# Patient Record
Sex: MALE | Race: WHITE | Employment: OTHER | URBAN - METROPOLITAN AREA
[De-identification: names, ages, dates, MRNs, and addresses within clinical notes are randomized per-mention and may not be internally consistent; named-entity substitution may affect disease eponyms.]

---

## 2020-06-09 VITALS
HEART RATE: 75 BPM | OXYGEN SATURATION: 93 % | DIASTOLIC BLOOD PRESSURE: 70 MMHG | TEMPERATURE: 97.4 F | RESPIRATION RATE: 18 BRPM | HEIGHT: 73 IN | SYSTOLIC BLOOD PRESSURE: 109 MMHG | WEIGHT: 274.47 LBS | BODY MASS INDEX: 36.38 KG/M2

## 2020-06-09 PROBLEM — E07.9 THYROID MASS: Status: ACTIVE | Noted: 2020-06-05

## 2020-06-09 PROBLEM — L03.115 CELLULITIS OF RIGHT LOWER EXTREMITY: Status: ACTIVE | Noted: 2020-06-05

## 2020-06-09 PROBLEM — S82.201A TIBIA/FIBULA FRACTURE, RIGHT, CLOSED, INITIAL ENCOUNTER: Status: ACTIVE | Noted: 2020-06-05

## 2020-06-09 PROBLEM — R09.02 HYPOXEMIA: Status: ACTIVE | Noted: 2020-06-05

## 2020-06-09 PROBLEM — S82.401A TIBIA/FIBULA FRACTURE, RIGHT, CLOSED, INITIAL ENCOUNTER: Status: ACTIVE | Noted: 2020-06-05

## 2020-06-09 RX ORDER — MULTIPLE VITAMINS W/ MINERALS TAB 9MG-400MCG
1 TAB ORAL DAILY
COMMUNITY

## 2020-06-09 RX ORDER — ASPIRIN 81 MG/1
81 TABLET ORAL DAILY
COMMUNITY

## 2020-06-09 RX ORDER — VENLAFAXINE HYDROCHLORIDE 150 MG/1
150 CAPSULE, EXTENDED RELEASE ORAL
COMMUNITY

## 2020-06-09 RX ORDER — BACLOFEN 20 MG/1
20 TABLET ORAL 4 TIMES DAILY
COMMUNITY
End: 2020-06-10

## 2020-06-09 RX ORDER — HYDROCHLOROTHIAZIDE 25 MG/1
25 TABLET ORAL DAILY
COMMUNITY
End: 2020-06-29

## 2020-06-09 RX ORDER — SENNOSIDES A AND B 8.6 MG/1
3 TABLET, FILM COATED ORAL 2 TIMES DAILY
COMMUNITY
End: 2020-07-13

## 2020-06-09 RX ORDER — CEPHALEXIN 500 MG/1
500 CAPSULE ORAL 3 TIMES DAILY
COMMUNITY
End: 2020-06-22

## 2020-06-09 RX ORDER — ACETAMINOPHEN 325 MG/1
325 TABLET ORAL 4 TIMES DAILY
COMMUNITY
End: 2020-06-10 | Stop reason: DRUGHIGH

## 2020-06-09 RX ORDER — GABAPENTIN 300 MG/1
300 CAPSULE ORAL 3 TIMES DAILY
COMMUNITY

## 2020-06-09 RX ORDER — GUAR GUM
PACKET (EA) ORAL DAILY
COMMUNITY
End: 2020-06-10

## 2020-06-09 RX ORDER — AMLODIPINE BESYLATE 10 MG/1
10 TABLET ORAL DAILY
COMMUNITY
End: 2020-07-10

## 2020-06-09 RX ORDER — CLOTRIMAZOLE 1 %
CREAM (GRAM) TOPICAL 2 TIMES DAILY PRN
COMMUNITY

## 2020-06-09 ASSESSMENT — MIFFLIN-ST. JEOR: SCORE: 2088.75

## 2020-06-09 NOTE — PROGRESS NOTES
" [facility]  Madhav Kirk is being evaluated via a billable video visit due to the restrictions of the Covid-19 pandemic and facility request that providers do not come into the building at this time.   The patient has been notified of following:  \"This video visit will be conducted via a call between you and your provider. We have found that certain health care needs can be provided without the need for an in-person physical exam.  This service lets us provide the care you need with a video conversation. If during the course of the call the provider feels a video visit is not appropriate, you will not be charged for this service.\"   The provider has received verbal consent for a Video Visit from the patient and or first contact? Yes  Patient/facility staff would like the video invitation sent by: N/A   Video Start Time: 9:30  Which Facility the Patient is at during the time of visit: [facility]  Received verbal consent to use Care Everywhere in order to access labs, documents, histories, and all other needed information to provide care at current facility.  PRIMARY CARE PROVIDER AND CLINIC:   Nikolai Good MD  [phone]  Chief Complaint   Patient presents with     Hospital F/U     [place] [facility] Medical Record Number:  [id number]  Madhav Kirk  is a 64 year old  (1955), admitted to the above facility from  [facility]. Hospital stay 6/5/2020 through 6/9/2020..  Admitted to this facility for  rehab, medical management and nursing care.  HPI:    HPI information obtained from: facility chart records, facility staff, patient report and Care Everywhere Epic chart review.   Brief Summary of Hospital Course:   Madhav Kirk is a 64 y.o. male with PMH Of right sided hemiparesis secondary to intraparenchymal brain bleed, HTN, presented to the ER after fall at home and knee pain-  trying to transfer to his bedside commode when the right leg gave out and he fell. Work up + for "  right proximal tib/fib fractures, RLE cellulitis and hypoxemia. Treated non surgical, NWB - placed in leg immobilizer with PT/OT referral  At baseline his ability to ambulate is limited to 8-10 feet with a walker. he was   CT chest PE study normal but did show incidental mediastinal mass and suggest pulmonary hypertension. Mass possibly thyroidal in nature -check u/s and Pul Eval as out patient -Consideration of bx vs PET scan.    FOLLOW-UP: He should see:   1. Dr. Hanson 7-10 days   2. Pulmonary for consult on mediastinal mass and hypoxemia within 1-2 weeks   3. Thyroid u/s within 7-10 days.    Updates on Status Since Skilled nursing Admission: Today he notes he is so tired due to not sleeping well the last several nights - due to bed and new environment. States pain is not well controlled and had to wait too long to get pain medications.      CODE STATUS/ADVANCE DIRECTIVES DISCUSSION:   CPR/Full code   Patient's living condition: lives alone  ALLERGIES: Patient has no known allergies.   Past Medical History:   Tibia/fibula fracture, right, closed, initial encounter 06/05/2020     Cellulitis of right lower extremity 06/05/2020     Hypoxemia 06/05/2020     Thyroid mass 06/05/2020     Acute kidney injury (HC) 07/06/2015     Hypertension 04/26/2015     Intraparenchymal hematoma of brain with right-sided weakness/hemiparesis. 04/22/2015     Right hemiparesis (HC) 04/22/2015     Cognitive deficits 04/22/2015     Right-sided lack of sensation 04/22/2015     Issue of Repeat Prescriptions [V68.1] 05/13/2009   Controlled substance agreement for vicodin on file and signed 05/05/09. Designated pharmacy: [facility] Prescribing physician: Dr. Dumont.    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  FAMILY HISTORY: family history includes Heart Disease in his father.  SOCIAL HISTORY:   reports that he has quit smoking. His smoking use included cigarettes. He has a 30.00 pack-year smoking history. He has never used smokeless tobacco. He reports previous alcohol use.  Current Outpatient Medications   Medication Sig Dispense Refill     acetaminophen (TYLENOL) 325 MG tablet Take 325 mg by mouth 4 times daily       amLODIPine (NORVASC) 10 MG tablet Take 10 mg by mouth daily       aspirin 81 MG EC tablet Take 81 mg by mouth daily       baclofen (LIORESAL) 20 MG tablet Take 20 mg by mouth 4 times daily Administer 7:00-10:00, 11:00-14:00, 15:00-18:00, 19:00-23:00       cephALEXin (KEFLEX) 500 MG capsule Take 500 mg by mouth 3 times daily Administer Between- 7:00-10:00, 11:00-14:00, 15:00-18:00       clotrimazole (LOTRIMIN) 1 % external cream Apply topically 2 times daily as needed       gabapentin (NEURONTIN) 300 MG capsule Take 300 mg by mouth 3 times daily Administer Between- 7:00-10:00, 11:00-14:00, 19:00-23:00       Guar Gum (FIBER MODULAR, NUTRISOURCE FIBER,) packet daily       hydrochlorothiazide (HYDRODIURIL) 25 MG tablet Take 25 mg by mouth daily       multivitamin w/minerals (THERA-VIT-M) tablet Take 1 tablet by mouth daily       oxyCODONE HCl (OXAYDO) 5 MG TABA Take 5-10 mg by mouth every 4 hours as needed (Give 5mg for pain 1-5/10 and 10 mg for pain 6-10/10)       senna (SENOKOT) 8.6 MG tablet Take 1 tablet by mouth 2 times daily       venlafaxine (EFFEXOR-XR) 150 MG 24 hr capsule Take 150 mg by mouth daily (with dinner)       Cyanocobalamin (VITAMIN B 12 PO) **No Dosage Info Given Upon Admission**          ROS: 10 point ROS of systems  "including Constitutional, Eyes, Respiratory, Cardiovascular, Gastroenterology, Genitourinary, Integumentary, Musculoskeletal, Psychiatric were all negative except for pertinent positives noted in my HPI.  Vitals:/70   Pulse 75   Temp 97.4  F (36.3  C)   Resp 18   Ht 1.854 m (6' 0.99\")   Wt 124.5 kg (274 lb 7.6 oz)   SpO2 93%   BMI 36.22 kg/m     Limited Visit Exam done given COVID-19 precautions:  GENERAL APPEARANCE: Alert, but appears groggy  in no distress, cooperative.  ENT: Mouth and posterior oropharynx normal, moist mucous membranes, hearing acuity at functional level  RESP: non labored breathing  CV: lower extremity edema: none  MS: R leg with immobilizer in palce  SKIN: Inspection of skin and subcutaneous tissue baseline w/ fragility.  NEuro: facial neuro all grossly normal.   PSYCH: Insight and judgement, memory intact, affect and mood calm.    Lab/Diagnostic data:                 ASSESSMENT/PLAN:  Closed fracture of right tibia and fibula with routine healing, subsequent encounter  Will change pain control to tyelnol 1000 QID max to decrease narcs  Appears quite groggy with 10 mg given this am.   Will adjust scheduled pain medications to prevent pain levels past 6   - oxyCODONE HCl (OXAYDO) 5 MG TABA; Take 5 mg by mouth 4 times daily. May also take 5 mg every 4 hours as needed (pain).  Starting therapy    Cellulitis of right lower extremity  Cont keflex until 6/16    Morbid obesity (H)  Noted - facility getting extra large bed to help accommodate comfort     Right hemiparesis (H)  Aiding difficulty with therapy - baseline.     Thyroid mass  New finding - will w/u with U/S as out pt -   Noted Free T4 not done in hosp and too late to add   Won't add now    Essential hypertension  Stable on PTA norvasc and diuretic    Hypoxia  New -   Noted differential Dx of Pulmonary hypertension -   Pul apt already scheduled for 16th per pt.     Slow transit constipation  Noted - will treat with increasing " medications.      ORders:  1. Increase senna to 2 tab po BID and 1 tab po BID PRN  2. Start Mirilax 17 gm po q am - hold if loose stools  3. Increase Tylenol to 1000 mg po QID  4. DC fiber supplement  5. Encourage IS q 2 hrs while awake  6. Wean O2 down to off as able to keep sats above 92%  7. Start oxycodone 5 mg po QID scheduled  8. Change oxycodone PRN to 5 mg po q 4hrs PRN    Electronically signed by:  FLORIN Musa CNP     Video-Visit Details  Type of service:  Video Visit  Video End Time (time video stopped): 9:55  Distant Location (provider location):  [facility]

## 2020-06-10 ENCOUNTER — VIRTUAL VISIT (OUTPATIENT)
Dept: GERIATRICS | Facility: CLINIC | Age: 65
End: 2020-06-10
Payer: COMMERCIAL

## 2020-06-10 DIAGNOSIS — I10 ESSENTIAL HYPERTENSION: ICD-10-CM

## 2020-06-10 DIAGNOSIS — E66.01 MORBID OBESITY (H): ICD-10-CM

## 2020-06-10 DIAGNOSIS — E07.9 THYROID MASS: ICD-10-CM

## 2020-06-10 DIAGNOSIS — S82.201D CLOSED FRACTURE OF RIGHT TIBIA AND FIBULA WITH ROUTINE HEALING, SUBSEQUENT ENCOUNTER: Primary | ICD-10-CM

## 2020-06-10 DIAGNOSIS — K59.01 SLOW TRANSIT CONSTIPATION: ICD-10-CM

## 2020-06-10 DIAGNOSIS — L03.115 CELLULITIS OF RIGHT LOWER EXTREMITY: ICD-10-CM

## 2020-06-10 DIAGNOSIS — G81.91 RIGHT HEMIPARESIS (H): ICD-10-CM

## 2020-06-10 DIAGNOSIS — S82.401D CLOSED FRACTURE OF RIGHT TIBIA AND FIBULA WITH ROUTINE HEALING, SUBSEQUENT ENCOUNTER: Primary | ICD-10-CM

## 2020-06-10 DIAGNOSIS — R09.02 HYPOXIA: ICD-10-CM

## 2020-06-10 PROCEDURE — 99305 1ST NF CARE MODERATE MDM 35: CPT | Mod: 95 | Performed by: NURSE PRACTITIONER

## 2020-06-10 RX ORDER — ACETAMINOPHEN 500 MG
1000 TABLET ORAL 4 TIMES DAILY
COMMUNITY

## 2020-06-10 NOTE — LETTER
"    6/10/2020        RE: [place] [address]         [facility]  Madhav Kirk is being evaluated via a billable video visit due to the restrictions of the Covid-19 pandemic and facility request that providers do not come into the building at this time.   The patient has been notified of following:  \"This video visit will be conducted via a call between you and your provider. We have found that certain health care needs can be provided without the need for an in-person physical exam.  This service lets us provide the care you need with a video conversation. If during the course of the call the provider feels a video visit is not appropriate, you will not be charged for this service.\"   The provider has received verbal consent for a Video Visit from the patient and or first contact? Yes  Patient/facility staff would like the video invitation sent by: N/A   Video Start Time: 9:30  Which Facility the Patient is at during the time of visit: [facility]  Received verbal consent to use Care Everywhere in order to access labs, documents, histories, and all other needed information to provide care at current facility.  PRIMARY CARE PROVIDER AND CLINIC:   Nikolai Good MD  [phone]  Chief Complaint   Patient presents with     Hospital F/U     [place] Medical Record Number:  [id number]  Madhav Kirk  is a 64 year old  (1955), admitted to the above facility from  [facility]. Hospital stay 6/5/2020 through 6/9/2020..  Admitted to this facility for  rehab, medical management and nursing care.  HPI:    HPI information obtained from: facility chart records, facility staff, patient report and Care Everywhere Epic chart review.   Brief Summary of Hospital Course:   Madhav Kirk is a 64 y.o. male with PMH Of right sided hemiparesis secondary to intraparenchymal brain bleed, HTN, presented to the ER after fall at home and knee pain-  " trying to transfer to his bedside commode when the right leg gave out and he fell. Work up + for  right proximal tib/fib fractures, RLE cellulitis and hypoxemia. Treated non surgical, NWB - placed in leg immobilizer with PT/OT referral  At baseline his ability to ambulate is limited to 8-10 feet with a walker. he was   CT chest PE study normal but did show incidental mediastinal mass and suggest pulmonary hypertension. Mass possibly thyroidal in nature -check u/s and Pul Eval as out patient -Consideration of bx vs PET scan.    FOLLOW-UP: He should see:   1. Dr. Hanson 7-10 days   2. Pulmonary for consult on mediastinal mass and hypoxemia within 1-2 weeks   3. Thyroid u/s within 7-10 days.    Updates on Status Since Skilled nursing Admission: Today he notes he is so tired due to not sleeping well the last several nights - due to bed and new environment. States pain is not well controlled and had to wait too long to get pain medications.      CODE STATUS/ADVANCE DIRECTIVES DISCUSSION:   CPR/Full code   Patient's living condition: lives alone  ALLERGIES: Patient has no known allergies.   Past Medical History:   Tibia/fibula fracture, right, closed, initial encounter 06/05/2020     Cellulitis of right lower extremity 06/05/2020     Hypoxemia 06/05/2020     Thyroid mass 06/05/2020     Acute kidney injury (HC) 07/06/2015     Hypertension 04/26/2015     Intraparenchymal hematoma of brain with right-sided weakness/hemiparesis. 04/22/2015     Right hemiparesis (HC) 04/22/2015     Cognitive deficits 04/22/2015     Right-sided lack of sensation 04/22/2015     Issue of Repeat Prescriptions [V68.1] 05/13/2009   Controlled substance agreement for vicodin on file and signed 05/05/09. Designated pharmacy: [facility] Prescribing physician: Dr. Dumont.    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  FAMILY HISTORY: family history includes Heart Disease in his father.  SOCIAL HISTORY:   reports that he has quit smoking. His smoking use included cigarettes. He has a 30.00 pack-year smoking history. He has never used smokeless tobacco. He reports previous alcohol use.  Current Outpatient Medications   Medication Sig Dispense Refill     acetaminophen (TYLENOL) 325 MG tablet Take 325 mg by mouth 4 times daily       amLODIPine (NORVASC) 10 MG tablet Take 10 mg by mouth daily       aspirin 81 MG EC tablet Take 81 mg by mouth daily       baclofen (LIORESAL) 20 MG tablet Take 20 mg by mouth 4 times daily Administer 7:00-10:00, 11:00-14:00, 15:00-18:00, 19:00-23:00       cephALEXin (KEFLEX) 500 MG capsule Take 500 mg by mouth 3 times daily Administer Between- 7:00-10:00, 11:00-14:00, 15:00-18:00       clotrimazole (LOTRIMIN) 1 % external cream Apply topically 2 times daily as needed       gabapentin (NEURONTIN) 300 MG capsule Take 300 mg by mouth 3 times daily Administer Between- 7:00-10:00, 11:00-14:00, 19:00-23:00       Guar Gum (FIBER MODULAR, NUTRISOURCE FIBER,) packet daily       hydrochlorothiazide (HYDRODIURIL) 25 MG tablet Take 25 mg by mouth daily       multivitamin w/minerals (THERA-VIT-M) tablet Take 1 tablet by mouth daily       oxyCODONE HCl (OXAYDO) 5 MG TABA Take 5-10 mg by mouth every 4 hours as needed (Give 5mg for pain 1-5/10 and 10 mg for pain 6-10/10)       senna (SENOKOT) 8.6 MG tablet Take 1 tablet by mouth 2 times daily       venlafaxine (EFFEXOR-XR) 150 MG 24 hr capsule Take 150 mg by mouth daily (with dinner)       Cyanocobalamin (VITAMIN  "B 12 PO) **No Dosage Info Given Upon Admission**          ROS: 10 point ROS of systems including Constitutional, Eyes, Respiratory, Cardiovascular, Gastroenterology, Genitourinary, Integumentary, Musculoskeletal, Psychiatric were all negative except for pertinent positives noted in my HPI.  Vitals:/70   Pulse 75   Temp 97.4  F (36.3  C)   Resp 18   Ht 1.854 m (6' 0.99\")   Wt 124.5 kg (274 lb 7.6 oz)   SpO2 93%   BMI 36.22 kg/m     Limited Visit Exam done given COVID-19 precautions:  GENERAL APPEARANCE: Alert, but appears groggy  in no distress, cooperative.  ENT: Mouth and posterior oropharynx normal, moist mucous membranes, hearing acuity at functional level  RESP: non labored breathing  CV: lower extremity edema: none  MS: R leg with immobilizer in palce  SKIN: Inspection of skin and subcutaneous tissue baseline w/ fragility.  NEuro: facial neuro all grossly normal.   PSYCH: Insight and judgement, memory intact, affect and mood calm.    Lab/Diagnostic data:                 ASSESSMENT/PLAN:  Closed fracture of right tibia and fibula with routine healing, subsequent encounter  Will change pain control to tyelnol 1000 QID max to decrease narcs  Appears quite groggy with 10 mg given this am.   Will adjust scheduled pain medications to prevent pain levels past 6   - oxyCODONE HCl (OXAYDO) 5 MG TABA; Take 5 mg by mouth 4 times daily. May also take 5 mg every 4 hours as needed (pain).  Starting therapy    Cellulitis of right lower extremity  Cont keflex until 6/16    Morbid obesity (H)  Noted - facility getting extra large bed to help accommodate comfort     Right hemiparesis (H)  Aiding difficulty with therapy - baseline.     Thyroid mass  New finding - will w/u with U/S as out pt -   Noted Free T4 not done in hosp and too late to add   Won't add now    Essential hypertension  Stable on PTA norvasc and diuretic    Hypoxia  New -   Noted differential Dx of Pulmonary hypertension -   Pul apt already scheduled " for 16th per pt.     Slow transit constipation  Noted - will treat with increasing medications.      ORders:  1. Increase senna to 2 tab po BID and 1 tab po BID PRN  2. Start Mirilax 17 gm po q am - hold if loose stools  3. Increase Tylenol to 1000 mg po QID  4. DC fiber supplement  5. Encourage IS q 2 hrs while awake  6. Wean O2 down to off as able to keep sats above 92%  7. Start oxycodone 5 mg po QID scheduled  8. Change oxycodone PRN to 5 mg po q 4hrs PRN    Electronically signed by:  FLORIN Musa CNP     Video-Visit Details  Type of service:  Video Visit  Video End Time (time video stopped): 9:55  Distant Location (provider location):  [facility]                 Sincerely,        FLORIN Musa CNP

## 2020-06-15 ENCOUNTER — TRANSFERRED RECORDS (OUTPATIENT)
Dept: HEALTH INFORMATION MANAGEMENT | Facility: CLINIC | Age: 65
End: 2020-06-15

## 2020-06-15 ENCOUNTER — VIRTUAL VISIT (OUTPATIENT)
Dept: GERIATRICS | Facility: CLINIC | Age: 65
End: 2020-06-15
Payer: COMMERCIAL

## 2020-06-15 VITALS
HEART RATE: 89 BPM | WEIGHT: 272.5 LBS | RESPIRATION RATE: 18 BRPM | BODY MASS INDEX: 35.96 KG/M2 | SYSTOLIC BLOOD PRESSURE: 113 MMHG | OXYGEN SATURATION: 94 % | DIASTOLIC BLOOD PRESSURE: 69 MMHG | TEMPERATURE: 97.2 F

## 2020-06-15 DIAGNOSIS — K59.01 SLOW TRANSIT CONSTIPATION: ICD-10-CM

## 2020-06-15 DIAGNOSIS — L03.115 CELLULITIS OF RIGHT LOWER EXTREMITY: ICD-10-CM

## 2020-06-15 DIAGNOSIS — S82.401D CLOSED FRACTURE OF RIGHT TIBIA AND FIBULA WITH ROUTINE HEALING, SUBSEQUENT ENCOUNTER: Primary | ICD-10-CM

## 2020-06-15 DIAGNOSIS — I10 ESSENTIAL HYPERTENSION: ICD-10-CM

## 2020-06-15 DIAGNOSIS — S82.201D CLOSED FRACTURE OF RIGHT TIBIA AND FIBULA WITH ROUTINE HEALING, SUBSEQUENT ENCOUNTER: Primary | ICD-10-CM

## 2020-06-15 PROCEDURE — 99309 SBSQ NF CARE MODERATE MDM 30: CPT | Mod: 95 | Performed by: NURSE PRACTITIONER

## 2020-06-15 RX ORDER — CYCLOBENZAPRINE HCL 10 MG
TABLET ORAL
Qty: 30 TABLET | Refills: 3 | Status: SHIPPED | OUTPATIENT
Start: 2020-06-15 | End: 2020-06-29

## 2020-06-15 NOTE — PROGRESS NOTES
"[facility]   Madhav Kirk is being evaluated via a billable video visit due to the restrictions of the Covid-19 pandemic and facility request that providers do not come into the building at this time.   The patient has been notified of following:  \"This video visit will be conducted via a call between you and your provider. We have found that certain health care needs can be provided without the need for an in-person physical exam.  This service lets us provide the care you need with a video conversation. If during the course of the call the provider feels a video visit is not appropriate, you will not be charged for this service.\"   The provider has received verbal consent for a Video Visit from the patient or first contact? Yes  Patient  or facility staff would like the video invitation sent by: N/A   Video Start Time: 10:26  [facility] Medical Record Number:  [id number]  Place of Location at the time of visit: [facility]  Chief Complaint   Patient presents with     RECHECK     HPI:  Madhav Kirk  is a 64 year old (1955), who is being seen today for a visit.  HPI information obtained from: facility chart records, facility staff, patient report and [facility] Epic chart review. Today's concern is: new pain in leg and constipation   BIll is rehabing s/p [facility] stay 6/5 - 6/9 after fall at home and knee pain; W/u + for right proximal tib/fib fractures, RLE cellulitis and hypoxemia. Treated non surgical, NWB - placed in leg immobilizer with PT/OT referral. Incidental finding of CT chest PE study normal but did show incidental mediastinal mass and suggest pulmonary hypertension. Mass possibly thyroidal in nature -check u/s and Pul Eval as out patient -Consideration of bx vs PET scan.    PMH Of right sided hemiparesis secondary to intraparenchymal brain bleed,&  HTN; baseline his ability to ambulate is limited to 8-10 feet with a walker.   Current orders/plan:  1. Dr." Margie 7-10 days   2. Pulmonary for consult on mediastinal mass and hypoxemia within 1-2 weeks   3. Thyroid u/s within 7-10 days.    He is c/o new muscle spasms in R lower leg that is so bad it's causing his leg to shake. He also has dermatitis 2/2 immobilizer from rubbing on leg.     Visit today showing immobilizer in the incorrect placement below the knee with knee contracted at about 80*    Past Medical and Surgical History reviewed in Epic today.  MEDICATIONS: Reviewed.    REVIEW OF SYSTEMS: 4 point ROS including Respiratory, CV, GI and , other than that noted in the HPI,  is negative  Objective: /69   Pulse 89   Temp 97.2  F (36.2  C)   Resp 18   Wt 123.6 kg (272 lb 8 oz)   SpO2 94%   BMI 35.96 kg/m    Limited visit exam done given COVID-19 precautions.   GENERAL APPEARANCE: Alert, in no distress, cooperative.  ENT: Mouth and posterior oropharynx normal, moist mucous membranes, hearing acuity at baseline function  RESP: non labored breathing  CV: lower extremity edema : none present geeta legs.   SKIN: Inspection of skin and subcutaneous tissue intact with healing scab on anterior R lower leg - proximal. No errythemia  NEURO: 2-12 appear at baseline  PSYCH: Insight and judgement, memory baseline intact, affect and mood calm.    Labs:   No new labs.     ASSESSMENT/PLAN:  Closed fracture of right tibia and fibula with routine healing, subsequent encounter with muscle spasm  Will X ray to verify current placement and have FERNANDO Sanders assess X ray from [facility] and PPX once done.   [facility] to call [facility] to update.     Will treat pain with more oxy and start flexeril - already on daily max of baclofen.     Cellulitis of right lower extremity  Appears resolving.     Essential hypertension  Stable. No change.     Slow transit constipation  Will treat with increase senna -offered sup - he doesn't feel like he needs it now.     Orders:    1. Increase Senna to 3 tabs po BID and keep PRN dx.  Constipation  2. Change scheduled Oxycodone to 5 mg po TID: 7 am, 11:00 and 15:00   3. And start Oxycodone 10 mg po q HS  4. And Keep Oxycodone 5 mg po q 4 hrs PRN  5. Start cyclobenzaprine 10 mg po q HS and 10 mg po BID PRN muscle spasm  6. Please contact his Ortho to update on brace misplacement and X ray ordered, and suggestions on how to re-extend R leg.      Electronically signed by:  FLORIN Musa CNP     Video-Visit Details  Type of service:  Video Visit  Video End Time (time video stopped): 10:50  Distant Location (provider location):  [facility]

## 2020-06-15 NOTE — LETTER
"    6/15/2020        RE: Madhav [place] [address]        [facility]   Madhav Kirk is being evaluated via a billable video visit due to the restrictions of the Covid-19 pandemic and facility request that providers do not come into the building at this time.   The patient has been notified of following:  \"This video visit will be conducted via a call between you and your provider. We have found that certain health care needs can be provided without the need for an in-person physical exam.  This service lets us provide the care you need with a video conversation. If during the course of the call the provider feels a video visit is not appropriate, you will not be charged for this service.\"   The provider has received verbal consent for a Video Visit from the patient or first contact? Yes  Patient  or facility staff would like the video invitation sent by: N/A   Video Start Time: 10:26  [facility] Medical Record Number:  [id number]  Place of Location at the time of visit: [facility]  Chief Complaint   Patient presents with     RECHECK     HPI:  Madhav Kirk  is a 64 year old (1955), who is being seen today for a visit.  HPI information obtained from: facility chart records, facility staff, patient report and [facility] chart review. Today's concern is: new pain in leg and constipation   BIll is rehabing s/p [facility] stay 6/5 - 6/9 after fall at home and knee pain; W/u + for right proximal tib/fib fractures, RLE cellulitis and hypoxemia. Treated non surgical, NWB - placed in leg immobilizer with PT/OT referral. Incidental finding of CT chest PE study normal but did show incidental mediastinal mass and suggest pulmonary hypertension. Mass possibly thyroidal in nature -check u/s and Pul Eval as out patient -Consideration of bx vs PET scan.    PMH Of right sided hemiparesis secondary to intraparenchymal brain bleed,&  HTN; baseline " his ability to ambulate is limited to 8-10 feet with a walker.   Current orders/plan:  1. Dr. Hanson 7-10 days   2. Pulmonary for consult on mediastinal mass and hypoxemia within 1-2 weeks   3. Thyroid u/s within 7-10 days.    He is c/o new muscle spasms in R lower leg that is so bad it's causing his leg to shake. He also has dermatitis 2/2 immobilizer from rubbing on leg.     Visit today showing immobilizer in the incorrect placement below the knee with knee contracted at about 80*    Past Medical and Surgical History reviewed in Epic today.  MEDICATIONS: Reviewed.    REVIEW OF SYSTEMS: 4 point ROS including Respiratory, CV, GI and , other than that noted in the HPI,  is negative  Objective: /69   Pulse 89   Temp 97.2  F (36.2  C)   Resp 18   Wt 123.6 kg (272 lb 8 oz)   SpO2 94%   BMI 35.96 kg/m    Limited visit exam done given COVID-19 precautions.   GENERAL APPEARANCE: Alert, in no distress, cooperative.  ENT: Mouth and posterior oropharynx normal, moist mucous membranes, hearing acuity at baseline function  RESP: non labored breathing  CV: lower extremity edema : none present geeta legs.   SKIN: Inspection of skin and subcutaneous tissue intact with healing scab on anterior R lower leg - proximal. No errythemia  NEURO: 2-12 appear at baseline  PSYCH: Insight and judgement, memory baseline intact, affect and mood calm.    Labs:   No new labs.     ASSESSMENT/PLAN:  Closed fracture of right tibia and fibula with routine healing, subsequent encounter with muscle spasm  Will X ray to verify current placement and have FERNANDO Sanders assess X ray from [facility] and PPX once done.   [facility] to call [facility] to update.     Will treat pain with more oxy and start flexeril - already on daily max of baclofen.     Cellulitis of right lower extremity  Appears resolving.     Essential hypertension  Stable. No change.     Slow transit constipation  Will treat with increase senna -offered sup - he doesn't feel like he needs it  now.     Orders:    1. Increase Senna to 3 tabs po BID and keep PRN dx. Constipation  2. Change scheduled Oxycodone to 5 mg po TID: 7 am, 11:00 and 15:00   3. And start Oxycodone 10 mg po q HS  4. And Keep Oxycodone 5 mg po q 4 hrs PRN  5. Start cyclobenzaprine 10 mg po q HS and 10 mg po BID PRN muscle spasm  6. Please contact his Ortho to update on brace misplacement and X ray ordered, and suggestions on how to re-extend R leg.      Electronically signed by:  FLORIN Musa CNP     Video-Visit Details  Type of service:  Video Visit  Video End Time (time video stopped): 10:50  Distant Location (provider location):  [facility]             Sincerely,        FLORIN Musa CNP

## 2020-06-15 NOTE — Clinical Note
Knee immobilizer on below knee.... I worry we dislocated fracture further over weekend.... can you look at X ray and compare to [facility] once PPX is done?

## 2020-06-17 VITALS
BODY MASS INDEX: 36.55 KG/M2 | TEMPERATURE: 96.7 F | OXYGEN SATURATION: 93 % | WEIGHT: 277 LBS | DIASTOLIC BLOOD PRESSURE: 67 MMHG | HEART RATE: 76 BPM | SYSTOLIC BLOOD PRESSURE: 105 MMHG | RESPIRATION RATE: 16 BRPM

## 2020-06-17 NOTE — PROGRESS NOTES
" [facility]  Madhav Kirk is being evaluated via a billable video visit due to the restrictions of the Covid-19 pandemic.   The patient has been notified of following:  \"This video visit will be conducted via a call between you and your provider. We have found that certain health care needs can be provided without the need for an in-person physical exam.  This service lets us provide the care you need with a video conversation. If during the course of the call the provider feels a video visit is not appropriate, you will not be charged for this service.\"   The provider has received verbal consent for a Video Visit from the patient and or first contact? Yes  Patient/facility staff would like the video invitation sent by: N/A   Video Start Time: 13:14  Which Facility the Patient is at during the time of visit: [facility]  PRIMARY CARE PROVIDER AND CLINIC:  Nikolai Good MD, [address] / [facility]  Chief Complaint   Patient presents with     Hospital F/U     [place] Medical Record Number:  [id number]  Madhav Kirk  is a 64 year old  (1955), admitted to the above facility from  [facility]. Hospital stay 6/5/2020 through 6/9/2020..  Admitted to this facility for  rehab, medical management and nursing care.    HPI:  HPI information obtained from: facility staff, patient report and [facility] chart review.   Brief Summary of Hospital Course: 2  - Pt with PMH notable for ICH/Rt hemiparesis, had a fall resulted in R proximal T/F fx, managed conservatively  - also was found to have RLE cellulitis and hypoxemia (PE ruled out, incidental mediastinal mass    Updates on Status Since Skilled nursing Admission:   - Started on OT/PT, reports making a progress  - Reports  Spasm pain this am in the right leg. Otherwise pain in under controlled. At times is is sharp and aggravated with movements, better with meds. Can reach as high as 10/10  - " denies fever or chills, redness in the leg is better.   - RN reports there is wound from the brace over the posterior surface.     ===========================================================  CODE STATUS/ADVANCE DIRECTIVES DISCUSSION:   CPR/Full code   Patient's living condition: lives alone   Past Medical History:   Tibia/fibula fracture, right, closed, initial encounter 06/05/2020     Cellulitis of right lower extremity 06/05/2020     Hypoxemia 06/05/2020     Thyroid mass 06/05/2020     Acute kidney injury (HC) 07/06/2015     Hypertension 04/26/2015     Intraparenchymal hematoma of brain with right-sided weakness/hemiparesis. 04/22/2015     Right hemiparesis (HC) 04/22/2015     Cognitive deficits 04/22/2015     Right-sided lack of sensation 04/22/2015     Issue of Repeat Prescriptions [V68.1] 05/13/2009   Controlled substance agreement for vicodin on file and signed 05/05/09. Designated pharmacy: [facility] Prescribing physician: Dr. Dumont.    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  FAMILY HISTORY: family history includes Heart Disease in his father.  SOCIAL HISTORY:   reports that he has quit smoking. His smoking use included cigarettes. He has a 30.00 pack-year smoking history. He has never used smokeless tobacco. He reports previous alcohol use.  ALLERGIES: Patient has no known allergies.  PAST MEDICAL HISTORY:   PAST SURGICAL HISTORY:   has no past surgical history on file.  FAMILY HISTORY: family history  includes Heart Disease in his father.  SOCIAL HISTORY:   reports that he has quit smoking. His smoking use included cigarettes. He has a 30.00 pack-year smoking history. He has never used smokeless tobacco. He reports previous alcohol use.  Current Outpatient Medications   Medication Sig Dispense Refill     acetaminophen (TYLENOL) 500 MG tablet Take 1,000 mg by mouth 4 times daily       amLODIPine (NORVASC) 10 MG tablet Take 10 mg by mouth daily       aspirin 81 MG EC tablet Take 81 mg by mouth daily       clotrimazole (LOTRIMIN) 1 % external cream Apply topically 2 times daily as needed       Cyanocobalamin (VITAMIN B 12 PO) Take 1 capsule by mouth daily       cyclobenzaprine (FLEXERIL) 10 MG tablet Take 1 tablet (10 mg) by mouth At Bedtime. May also take 1 tablet (10 mg) 2 times daily as needed for muscle spasms. 30 tablet 3     gabapentin (NEURONTIN) 300 MG capsule Take 300 mg by mouth 3 times daily Administer Between- 7:00-10:00, 11:00-14:00, 19:00-23:00       hydrochlorothiazide (HYDRODIURIL) 25 MG tablet Take 25 mg by mouth daily       multivitamin w/minerals (THERA-VIT-M) tablet Take 1 tablet by mouth daily       oxyCODONE HCl (OXAYDO) 5 MG TABA Take 5 mg by mouth 3 times daily AND 10 mg every evening. May also take 5 mg every 4 hours as needed (pain). 40 each 0     Polyethylene Glycol 3350 (MIRALAX PO) Take 1 packet by mouth daily       senna (SENOKOT) 8.6 MG tablet Take 3 tablets by mouth 2 times daily And 1 tab po BID PRN       venlafaxine (EFFEXOR-XR) 150 MG 24 hr capsule Take 150 mg by mouth daily (with dinner)        ROS: 10 point ROS of systems including Constitutional, Eyes, Respiratory, Cardiovascular, Gastroenterology, Genitourinary, Integumentary, Musculoskeletal, Psychiatric were all negative except for pertinent positives noted in my HPI.  Vitals:/67   Pulse 76   Temp 96.7  F (35.9  C)   Resp 16   Wt 125.6 kg (277 lb)   SpO2 93%   BMI 36.55 kg/m     Limited Visit Exam done given  COVID-19 precautions:  .GENERAL APPEARANCE:  in no distress  RESP:  unlabored breathing  M/S:   no joint deformity noted  SKIN:  no rash noted  NEURO:   no purposeful movement in upper and lower extremities  PSYCH:  affect and mood normal      Lab/Diagnostic data: Reviewed in the chart and EHR.      ASSESSMENT/PLAN:  --------------------------------  Closed fracture of right tibia and fibula with routine healing, subsequent encounter  Right Hemiparesis 2/2 hx of ICH (H)  Physical deconditioning  - started rehab program, making a progress, continue until desired goal is achieved.   - analgesia optimal  - continue to follow on Ortho's recommendations.       Mediastinal mass, incidental finding, query thyroid mass:  Pulmonary HTN  Hypoxemia while hospitalized.   - work up on outpatient basis. PCP kindly to follow.   - scheduled to see Pulmonology on outpatient basis.   - may benefit from sleep study on outpatient basis.counseled.     Essential HTN:  BP Readings from Last 3 Encounters:   06/17/20 105/67   06/15/20 113/69   06/09/20 109/70   - on meds.     Slow transit constipation : stable.     Cellulitis, RLE: improved.     See above, otherwise, continue the rest of the current POC.       Electronically signed by:  Negro Bobo MD   -     Video-Visit Details  Type of service:  Video Visit  Video End Time (time video stopped): 13:30  Distant Location (provider location):  [facility]

## 2020-06-18 ENCOUNTER — VIRTUAL VISIT (OUTPATIENT)
Dept: GERIATRICS | Facility: CLINIC | Age: 65
End: 2020-06-18
Payer: COMMERCIAL

## 2020-06-18 DIAGNOSIS — I10 ESSENTIAL HYPERTENSION: ICD-10-CM

## 2020-06-18 DIAGNOSIS — S82.201D CLOSED FRACTURE OF RIGHT TIBIA AND FIBULA WITH ROUTINE HEALING, SUBSEQUENT ENCOUNTER: Primary | ICD-10-CM

## 2020-06-18 DIAGNOSIS — E66.01 MORBID OBESITY (H): ICD-10-CM

## 2020-06-18 DIAGNOSIS — E07.9 THYROID MASS: ICD-10-CM

## 2020-06-18 DIAGNOSIS — S82.401D CLOSED FRACTURE OF RIGHT TIBIA AND FIBULA WITH ROUTINE HEALING, SUBSEQUENT ENCOUNTER: Primary | ICD-10-CM

## 2020-06-18 DIAGNOSIS — R09.02 HYPOXIA: ICD-10-CM

## 2020-06-18 DIAGNOSIS — K59.01 SLOW TRANSIT CONSTIPATION: ICD-10-CM

## 2020-06-18 DIAGNOSIS — G81.91 RIGHT HEMIPARESIS (H): ICD-10-CM

## 2020-06-18 PROCEDURE — 99309 SBSQ NF CARE MODERATE MDM 30: CPT | Mod: 95 | Performed by: FAMILY MEDICINE

## 2020-06-18 PROCEDURE — 99207 ZZC CDG-MDM COMPONENT: MEETS LOW - DOWN CODED: CPT | Performed by: FAMILY MEDICINE

## 2020-06-18 NOTE — LETTER
"    6/18/2020        RE: Madhav [address]  [facility] [address]         [facility]  Madhav Kirk is being evaluated via a billable video visit due to the restrictions of the Covid-19 pandemic.   The patient has been notified of following:  \"This video visit will be conducted via a call between you and your provider. We have found that certain health care needs can be provided without the need for an in-person physical exam.  This service lets us provide the care you need with a video conversation. If during the course of the call the provider feels a video visit is not appropriate, you will not be charged for this service.\"   The provider has received verbal consent for a Video Visit from the patient and or first contact? Yes  Patient/facility staff would like the video invitation sent by: N/A   Video Start Time: 13:14  Which Facility the Patient is at during the time of visit: [facility]  PRIMARY CARE PROVIDER AND CLINIC:  Nikolai Good MD, [address] / [facility]  Chief Complaint   Patient presents with     Hospital F/U     [place] Medical Record Number:  [id number]  Madhav Kirk  is a 64 year old  (1955), admitted to the above facility from  [facility]. Hospital stay 6/5/2020 through 6/9/2020..  Admitted to this facility for  rehab, medical management and nursing care.    HPI:  HPI information obtained from: facility staff, patient report and [facility] chart review.   Brief Summary of Hospital Course: 2  - Pt with PMH notable for ICH/Rt hemiparesis, had a fall resulted in R proximal T/F fx, managed conservatively  - also was found to have RLE cellulitis and hypoxemia (PE ruled out, incidental mediastinal mass    Updates on Status Since Skilled nursing Admission:   - Started on OT/PT, reports making a progress  - Reports  Spasm pain this am in the right leg. Otherwise pain in under controlled. At times " is is sharp and aggravated with movements, better with meds. Can reach as high as 10/10  - denies fever or chills, redness in the leg is better.   - RN reports there is wound from the brace over the posterior surface.     ===========================================================  CODE STATUS/ADVANCE DIRECTIVES DISCUSSION:   CPR/Full code   Patient's living condition: lives alone   Past Medical History:   Tibia/fibula fracture, right, closed, initial encounter 06/05/2020     Cellulitis of right lower extremity 06/05/2020     Hypoxemia 06/05/2020     Thyroid mass 06/05/2020     Acute kidney injury (HC) 07/06/2015     Hypertension 04/26/2015     Intraparenchymal hematoma of brain with right-sided weakness/hemiparesis. 04/22/2015     Right hemiparesis (HC) 04/22/2015     Cognitive deficits 04/22/2015     Right-sided lack of sensation 04/22/2015     Issue of Repeat Prescriptions [V68.1] 05/13/2009   Controlled substance agreement for vicodin on file and signed 05/05/09. Designated pharmacy: [facility] Prescribing physician: Dr. Dumont.    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  FAMILY HISTORY: family history includes Heart Disease in his father.  SOCIAL HISTORY:   reports that he has quit smoking. His smoking use included cigarettes. He has a 30.00 pack-year smoking history. He has never used smokeless tobacco. He reports previous alcohol use.  ALLERGIES: Patient has no known allergies.  PAST MEDICAL HISTORY:   PAST  SURGICAL HISTORY:   has no past surgical history on file.  FAMILY HISTORY: family history includes Heart Disease in his father.  SOCIAL HISTORY:   reports that he has quit smoking. His smoking use included cigarettes. He has a 30.00 pack-year smoking history. He has never used smokeless tobacco. He reports previous alcohol use.  Current Outpatient Medications   Medication Sig Dispense Refill     acetaminophen (TYLENOL) 500 MG tablet Take 1,000 mg by mouth 4 times daily       amLODIPine (NORVASC) 10 MG tablet Take 10 mg by mouth daily       aspirin 81 MG EC tablet Take 81 mg by mouth daily       clotrimazole (LOTRIMIN) 1 % external cream Apply topically 2 times daily as needed       Cyanocobalamin (VITAMIN B 12 PO) Take 1 capsule by mouth daily       cyclobenzaprine (FLEXERIL) 10 MG tablet Take 1 tablet (10 mg) by mouth At Bedtime. May also take 1 tablet (10 mg) 2 times daily as needed for muscle spasms. 30 tablet 3     gabapentin (NEURONTIN) 300 MG capsule Take 300 mg by mouth 3 times daily Administer Between- 7:00-10:00, 11:00-14:00, 19:00-23:00       hydrochlorothiazide (HYDRODIURIL) 25 MG tablet Take 25 mg by mouth daily       multivitamin w/minerals (THERA-VIT-M) tablet Take 1 tablet by mouth daily       oxyCODONE HCl (OXAYDO) 5 MG TABA Take 5 mg by mouth 3 times daily AND 10 mg every evening. May also take 5 mg every 4 hours as needed (pain). 40 each 0     Polyethylene Glycol 3350 (MIRALAX PO) Take 1 packet by mouth daily       senna (SENOKOT) 8.6 MG tablet Take 3 tablets by mouth 2 times daily And 1 tab po BID PRN       venlafaxine (EFFEXOR-XR) 150 MG 24 hr capsule Take 150 mg by mouth daily (with dinner)        ROS: 10 point ROS of systems including Constitutional, Eyes, Respiratory, Cardiovascular, Gastroenterology, Genitourinary, Integumentary, Musculoskeletal, Psychiatric were all negative except for pertinent positives noted in my HPI.  Vitals:/67   Pulse 76   Temp 96.7  F (35.9  C)   Resp 16    Wt 125.6 kg (277 lb)   SpO2 93%   BMI 36.55 kg/m     Limited Visit Exam done given COVID-19 precautions:  .GENERAL APPEARANCE:  in no distress  RESP:  unlabored breathing  M/S:   no joint deformity noted  SKIN:  no rash noted  NEURO:   no purposeful movement in upper and lower extremities  PSYCH:  affect and mood normal      Lab/Diagnostic data: Reviewed in the chart and EHR.      ASSESSMENT/PLAN:  --------------------------------  Closed fracture of right tibia and fibula with routine healing, subsequent encounter  Right Hemiparesis 2/2 hx of ICH (H)  Physical deconditioning  - started rehab program, making a progress, continue until desired goal is achieved.   - analgesia optimal  - continue to follow on Ortho's recommendations.       Mediastinal mass, incidental finding, query thyroid mass:  Pulmonary HTN  Hypoxemia while hospitalized.   - work up on outpatient basis. PCP kindly to follow.   - scheduled to see Pulmonology on outpatient basis.   - may benefit from sleep study on outpatient basis.counseled.     Essential HTN:  BP Readings from Last 3 Encounters:   06/17/20 105/67   06/15/20 113/69   06/09/20 109/70   - on meds.     Slow transit constipation : stable.     Cellulitis, RLE: improved.     See above, otherwise, continue the rest of the current POC.       Electronically signed by:  Negro Bobo MD   -     Video-Visit Details  Type of service:  Video Visit  Video End Time (time video stopped): 13:30  Distant Location (provider location):  [facility]                 Sincerely,        Negro Bobo MD

## 2020-06-19 VITALS
DIASTOLIC BLOOD PRESSURE: 73 MMHG | OXYGEN SATURATION: 93 % | TEMPERATURE: 97.6 F | SYSTOLIC BLOOD PRESSURE: 118 MMHG | BODY MASS INDEX: 36.41 KG/M2 | WEIGHT: 275.9 LBS | RESPIRATION RATE: 18 BRPM | HEART RATE: 86 BPM

## 2020-06-19 NOTE — PROGRESS NOTES
"[facility]   Madhav Kirk is being evaluated via a billable video visit due to the restrictions of the Covid-19 pandemic and facility request that providers do not come into the building at this time.   The patient has been notified of following:  \"This video visit will be conducted via a call between you and your provider. We have found that certain health care needs can be provided without the need for an in-person physical exam.  This service lets us provide the care you need with a video conversation. If during the course of the call the provider feels a video visit is not appropriate, you will not be charged for this service.\"   The provider has received verbal consent for a Video Visit from the patient or first contact? Yes  Patient  or facility staff would like the video invitation sent by: N/A   Video Start Time: 10:52  [place] Medical Record Number:  [id number]  Place of Location at the time of visit: [facility]  Chief Complaint   Patient presents with     RECHECK     HPI:  Madhav Kirk  is a 64 year old (1955), who is being seen today for a visit.  HPI information obtained from: facility chart records, facility staff, patient report and [place] Epic chart review. Today's concern is f/u muscle spasm/pain    BIll is rehabing s/p [facility] stay 6/5 - 6/9 after fall at home and knee pain; W/u + for right proximal tib/fib fractures, RLE cellulitis and hypoxemia. Treated non surgical, NWB - placed in leg immobilizer with PT/OT referral. Incidental finding of CT chest PE study normal but did show incidental mediastinal mass and suggest pulmonary hypertension. Mass possibly thyroidal in nature -check u/s and Pul Eval as out patient -Consideration of bx vs PET scan.    PMH Of right sided hemiparesis secondary to intraparenchymal brain bleed,&  HTN; baseline his ability to ambulate is limited to 8-10 feet with a walker.   Current orders/plan:  1. Dr. Hanson 7-10 " days   2. Pulmonary for consult on mediastinal mass and hypoxemia within 1-2 weeks   3. Thyroid u/s within 7-10 days.    At SNF:   6/15: Acute muscle spasms, worsening pain and constipation. X ray done (no change), oxy changed to 5 mg TID and 10 HS and 5  q 4 prn, Start Flexeril 10 q HS and 10 BID PRN     Follow-up today with only 1 dose of PRN flexeril used on the 16th and only 2 doses of PRN oxy on the 20th and 22. He does have skin breakdown on R calf from the brace rubbing during muscle spasm.   He states his muscle spasm are much better right now and pain is finally controlled. He does not feel too drugged during the day and his bowels are working much better.   He does still have O2 on.     Past Medical and Surgical History reviewed in Epic today.  MEDICATIONS: Reviewed.    REVIEW OF SYSTEMS: 4 point ROS including Respiratory, CV, GI and , other than that noted in the HPI,  is negative  Objective: /73   Pulse 86   Temp 97.6  F (36.4  C)   Resp 18   Wt 125.1 kg (275 lb 14.4 oz)   SpO2 93%   BMI 36.41 kg/m    Limited visit exam done given COVID-19 precautions.   GENERAL APPEARANCE: Alert, in no distress, cooperative.  ENT: Mouth and posterior oropharynx normal, moist mucous membranes, hearing acuity stable - funcational  RESP: non labored breathing  CV: lower extremity edema none  SKIN: Inspection of skin and subcutaneous tissue - R lower calf ucler viewed - dry skin surrounding. .  NEURO: 2-12 appear at baseline R hemiplegia   PSYCH: Insight and judgement, memory baseline intact, affect and mood calm.     Labs:   Recent labs in [facility] reviewed by me today.     ASSESSMENT/PLAN:  Hypoxia  O2 sat readings all 92% - will try to wean off O2 today  Noted IS is only at 500 - that may be baseline as he sates he does not feel SOB, no cough.   Hx of smoking years ago.     Closed fracture of right tibia and fibula with routine healing, subsequent encounter and muscle spasm  Much better controlled -discussed  about GDR on oxy and he states he isn't ready at this time.     Essential hypertension  Stable - no change.     Skin ulcer of right calf, limited to breakdown of skin (H)  Noted - scabbed - should heal - NSg interventions in plac.     Orders  1. Wean O2 to off to keep sats above 92%   2. Mineral oil to dry skin on leg.   F/u in 1 week or sooner with narc reduction.     Electronically signed by:  FLORIN Musa CNP     Video-Visit Details  Type of service:  Video Visit  Video End Time (time video stopped): 11:10  Distant Location (provider location):  [facility]

## 2020-06-22 ENCOUNTER — VIRTUAL VISIT (OUTPATIENT)
Dept: GERIATRICS | Facility: CLINIC | Age: 65
End: 2020-06-22
Payer: COMMERCIAL

## 2020-06-22 DIAGNOSIS — S82.201D CLOSED FRACTURE OF RIGHT TIBIA AND FIBULA WITH ROUTINE HEALING, SUBSEQUENT ENCOUNTER: ICD-10-CM

## 2020-06-22 DIAGNOSIS — L97.211 SKIN ULCER OF RIGHT CALF, LIMITED TO BREAKDOWN OF SKIN (H): ICD-10-CM

## 2020-06-22 DIAGNOSIS — I10 ESSENTIAL HYPERTENSION: ICD-10-CM

## 2020-06-22 DIAGNOSIS — M62.838 MUSCLE SPASM: ICD-10-CM

## 2020-06-22 DIAGNOSIS — R09.02 HYPOXIA: Primary | ICD-10-CM

## 2020-06-22 DIAGNOSIS — S82.401D CLOSED FRACTURE OF RIGHT TIBIA AND FIBULA WITH ROUTINE HEALING, SUBSEQUENT ENCOUNTER: ICD-10-CM

## 2020-06-22 PROCEDURE — 99309 SBSQ NF CARE MODERATE MDM 30: CPT | Mod: 95 | Performed by: NURSE PRACTITIONER

## 2020-06-22 NOTE — LETTER
"    6/22/2020        RE: Madhav [place] [address]        [facility]   Madhav Kirk is being evaluated via a billable video visit due to the restrictions of the Covid-19 pandemic and facility request that providers do not come into the building at this time.   The patient has been notified of following:  \"This video visit will be conducted via a call between you and your provider. We have found that certain health care needs can be provided without the need for an in-person physical exam.  This service lets us provide the care you need with a video conversation. If during the course of the call the provider feels a video visit is not appropriate, you will not be charged for this service.\"   The provider has received verbal consent for a Video Visit from the patient or first contact? Yes  Patient  or facility staff would like the video invitation sent by: N/A   Video Start Time: 10:52  [place] Medical Record Number:  [id number]  Place of Location at the time of visit: [facility]  Chief Complaint   Patient presents with     RECHECK     HPI:  Madhav Kirk  is a 64 year old (1955), who is being seen today for a visit.  HPI information obtained from: facility chart records, facility staff, patient report and [facility] chart review. Today's concern is f/u muscle spasm/pain    BIll is rehabing s/p [facility] stay 6/5 - 6/9 after fall at home and knee pain; W/u + for right proximal tib/fib fractures, RLE cellulitis and hypoxemia. Treated non surgical, NWB - placed in leg immobilizer with PT/OT referral. Incidental finding of CT chest PE study normal but did show incidental mediastinal mass and suggest pulmonary hypertension. Mass possibly thyroidal in nature -check u/s and Pul Eval as out patient -Consideration of bx vs PET scan.    PMH Of right sided hemiparesis secondary to intraparenchymal brain bleed,&  HTN; baseline his ability " to ambulate is limited to 8-10 feet with a walker.   Current orders/plan:  1. Dr. Hanson 7-10 days   2. Pulmonary for consult on mediastinal mass and hypoxemia within 1-2 weeks   3. Thyroid u/s within 7-10 days.    At SNF:   6/15: Acute muscle spasms, worsening pain and constipation. X ray done (no change), oxy changed to 5 mg TID and 10 HS and 5  q 4 prn, Start Flexeril 10 q HS and 10 BID PRN     Follow-up today with only 1 dose of PRN flexeril used on the 16th and only 2 doses of PRN oxy on the 20th and 22. He does have skin breakdown on R calf from the brace rubbing during muscle spasm.   He states his muscle spasm are much better right now and pain is finally controlled. He does not feel too drugged during the day and his bowels are working much better.   He does still have O2 on.     Past Medical and Surgical History reviewed in Epic today.  MEDICATIONS: Reviewed.    REVIEW OF SYSTEMS: 4 point ROS including Respiratory, CV, GI and , other than that noted in the HPI,  is negative  Objective: /73   Pulse 86   Temp 97.6  F (36.4  C)   Resp 18   Wt 125.1 kg (275 lb 14.4 oz)   SpO2 93%   BMI 36.41 kg/m    Limited visit exam done given COVID-19 precautions.   GENERAL APPEARANCE: Alert, in no distress, cooperative.  ENT: Mouth and posterior oropharynx normal, moist mucous membranes, hearing acuity stable - funcational  RESP: non labored breathing  CV: lower extremity edema none  SKIN: Inspection of skin and subcutaneous tissue - R lower calf ucler viewed - dry skin surrounding. .  NEURO: 2-12 appear at baseline R hemiplegia   PSYCH: Insight and judgement, memory baseline intact, affect and mood calm.     Labs:   Recent labs in [facility] reviewed by me today.     ASSESSMENT/PLAN:  Hypoxia  O2 sat readings all 92% - will try to wean off O2 today  Noted IS is only at 500 - that may be baseline as he sates he does not feel SOB, no cough.   Hx of smoking years ago.     Closed fracture of right tibia and fibula  with routine healing, subsequent encounter and muscle spasm  Much better controlled -discussed about GDR on oxy and he states he isn't ready at this time.     Essential hypertension  Stable - no change.     Skin ulcer of right calf, limited to breakdown of skin (H)  Noted - scabbed - should heal - NSg interventions in plac.     Orders  1. Wean O2 to off to keep sats above 92%   2. Mineral oil to dry skin on leg.   F/u in 1 week or sooner with narc reduction.     Electronically signed by:  FLORIN Musa CNP     Video-Visit Details  Type of service:  Video Visit  Video End Time (time video stopped): 11:10  Distant Location (provider location):  [place] [facility]             Sincerely,        FLORIN Musa CNP

## 2020-06-26 NOTE — PROGRESS NOTES
"[place] [facility]   Madhav Kirk is being evaluated via a billable video visit due to the restrictions of the Covid-19 pandemic and facility request that providers do not come into the building at this time.   The patient has been notified of following:  \"This video visit will be conducted via a call between you and your provider. We have found that certain health care needs can be provided without the need for an in-person physical exam.  This service lets us provide the care you need with a video conversation. If during the course of the call the provider feels a video visit is not appropriate, you will not be charged for this service.\"   The provider has received verbal consent for a Video Visit from the patient or first contact? Yes  Patient  or facility staff would like the video invitation sent by: N/A   Video Start Time: 10:35  [place] Medical Record Number:  [id number]  Place of Location at the time of visit: [facility]  Chief Complaint   Patient presents with     RECHECK     HPI:  Madhav Kirk  is a 64 year old (1955), who is being seen today for a visit.  HPI information obtained from: facility chart records, facility staff, patient report and [facility] Epic chart review. Today's concern is f/u on pain control and muscle spasms after medication changes last week.         Patricia is rehabing s/p [facility] H[facility] stay 6/5 - 6/9 after fall at home and knee pain; W/u + for right proximal tib/fib fractures, RLE cellulitis and hypoxemia. Treated non surgical, NWB - placed in leg immobilizer with PT/OT referral. Incidental finding of CT chest PE study normal but did show incidental mediastinal mass and suggest pulmonary hypertension. Mass possibly thyroidal in nature -check u/s and Pul Eval as out patient -Consideration of bx vs PET scan.    PMH Of right sided hemiparesis secondary to intraparenchymal brain bleed,&  HTN; baseline his ability to ambulate is limited to 8-10 feet " with a walker.   Current orders/plan:  1. Dr. Hanson 7-10 days   2. Pulmonary for consult on mediastinal mass and hypoxemia within 1-2 weeks   3. Thyroid u/s within 7-10 days.     At SNF:   6/15: Acute muscle spasms, worsening pain and constipation. X ray done (no change), oxy changed to 5 mg TID and 10 HS and 5  q 4 prn, Start Flexeril 10 q HS and 10 BID PRN   6/22: Trialing off O2, coccyx ulcer    F/u today showing occ desating with Room air. IS machine broke, but pt was using it and trying deep breath exercises. States still having sig muscle spasms with involuntary foot/leg movement.   Wound on thigh healing but coccyx ulcer still present     Past Medical and Surgical History reviewed in Epic today.  MEDICATIONS: Reviewed.    REVIEW OF SYSTEMS: 4 point ROS including Respiratory, CV, GI and , other than that noted in the HPI,  is negative  Objective: /63   Pulse 78   Temp 97.3  F (36.3  C)   Resp 18   Wt 120.1 kg (264 lb 11.2 oz)   SpO2 93%   BMI 34.93 kg/m    Limited visit exam done given COVID-19 precautions.   GENERAL APPEARANCE: Alert, in no distress, cooperative.  ENT: Mouth and posterior oropharynx normal, moist mucous membranes, hearing acuity functional  RESP: non labored breathing- no coughing  CV: lower extremity edema : none in either leg  SKIN: Inspection of skin and subcutaneous tissue intact except ulcer in coccyx  NEURO: 2-12 appear at baseline  PSYCH: Insight and judgement, memory baseline intact, affect and mood up-beat.    Labs:   Recent labs in [facility] reviewed by me today.     ASSESSMENT/PLAN:  Closed fracture of right tibia and fibula with routine healing, subsequent encounter and Muscle spasm  Will increase Flexeril to tid scheduled given pt still taking PRN and still occ having muscle spasms so severe it is causing involuntary leg movements.   - pain appears better controlled and pt agreeable to trial decrease in scheduled oxy. - will decrease by 5 mg/day and decrease frequency  of PRN- only using about -1-2/day prn     Decubitus ulcer of coccyx, unstageable (H)  Discussed with wound care RN at [facility] - start medihoney - dressing to help with slough    Essential hypertension  Lower than needed (and no edema) will stop diuretic, but if any edema - will restart and lower CCB    Orders:  1. discontinue all current Flexeril  2. Start Flexeril 10 mg po TID dx. Muscle spasm  3. Change Oxycodone to 5 mg po q am, noon and 10 mg po q HS and 5 mg po q 6 hrs PRN   4. discontinue hydrochlorothiazide  - wound care per wound RN.   - cont to try to wean off O2 and do lung exercises (nurses getting new IS for him).     If any edema or HTN increases, will restart hydrochlorothiazide.     Electronically signed by:  FLORIN Musa CNP     Video-Visit Details  Type of service:  Video Visit  Video End Time (time video stopped): 10:45  Distant Location (provider location):  [facility]

## 2020-06-29 ENCOUNTER — VIRTUAL VISIT (OUTPATIENT)
Dept: GERIATRICS | Facility: CLINIC | Age: 65
End: 2020-06-29
Payer: COMMERCIAL

## 2020-06-29 VITALS
SYSTOLIC BLOOD PRESSURE: 104 MMHG | OXYGEN SATURATION: 93 % | HEART RATE: 78 BPM | TEMPERATURE: 97.3 F | BODY MASS INDEX: 34.93 KG/M2 | RESPIRATION RATE: 18 BRPM | DIASTOLIC BLOOD PRESSURE: 63 MMHG | WEIGHT: 264.7 LBS

## 2020-06-29 DIAGNOSIS — I10 ESSENTIAL HYPERTENSION: ICD-10-CM

## 2020-06-29 DIAGNOSIS — S82.201D CLOSED FRACTURE OF RIGHT TIBIA AND FIBULA WITH ROUTINE HEALING, SUBSEQUENT ENCOUNTER: Primary | ICD-10-CM

## 2020-06-29 DIAGNOSIS — M62.838 MUSCLE SPASM: ICD-10-CM

## 2020-06-29 DIAGNOSIS — S82.401D CLOSED FRACTURE OF RIGHT TIBIA AND FIBULA WITH ROUTINE HEALING, SUBSEQUENT ENCOUNTER: Primary | ICD-10-CM

## 2020-06-29 DIAGNOSIS — L89.150 DECUBITUS ULCER OF COCCYX, UNSTAGEABLE (H): ICD-10-CM

## 2020-06-29 PROCEDURE — 99309 SBSQ NF CARE MODERATE MDM 30: CPT | Mod: 95 | Performed by: NURSE PRACTITIONER

## 2020-06-29 RX ORDER — CYCLOBENZAPRINE HCL 10 MG
10 TABLET ORAL 3 TIMES DAILY
Qty: 30 TABLET | Refills: 3
Start: 2020-06-29 | End: 2020-07-13

## 2020-06-29 RX ORDER — OXYCODONE HYDROCHLORIDE 5 MG/1
TABLET ORAL
Qty: 38 TABLET | Refills: 0 | Status: SHIPPED | OUTPATIENT
Start: 2020-06-29 | End: 2020-07-03

## 2020-06-29 NOTE — LETTER
"    6/29/2020        RE: Madhav [place] [address]        [facility]   Madhav Kirk is being evaluated via a billable video visit due to the restrictions of the Covid-19 pandemic and facility request that providers do not come into the building at this time.   The patient has been notified of following:  \"This video visit will be conducted via a call between you and your provider. We have found that certain health care needs can be provided without the need for an in-person physical exam.  This service lets us provide the care you need with a video conversation. If during the course of the call the provider feels a video visit is not appropriate, you will not be charged for this service.\"   The provider has received verbal consent for a Video Visit from the patient or first contact? Yes  Patient  or facility staff would like the video invitation sent by: N/A   Video Start Time: 10:35  [place] Medical Record Number:  [id number]  Place of Location at the time of visit: [facility]  Chief Complaint   Patient presents with     RECHECK     HPI:  Madhav Kirk  is a 64 year old (1955), who is being seen today for a visit.  HPI information obtained from: facility chart records, facility staff, patient report and [facility] chart review. Today's concern is f/u on pain control and muscle spasms after medication changes last week.         Patricia is rehabing s/p [facility] H[facility] stay 6/5 - 6/9 after fall at home and knee pain; W/u + for right proximal tib/fib fractures, RLE cellulitis and hypoxemia. Treated non surgical, NWB - placed in leg immobilizer with PT/OT referral. Incidental finding of CT chest PE study normal but did show incidental mediastinal mass and suggest pulmonary hypertension. Mass possibly thyroidal in nature -check u/s and Pul Eval as out patient -Consideration of bx vs PET scan.    PMH Of right sided hemiparesis secondary to " intraparenchymal brain bleed,&  HTN; baseline his ability to ambulate is limited to 8-10 feet with a walker.   Current orders/plan:  1. Dr. Hanson 7-10 days   2. Pulmonary for consult on mediastinal mass and hypoxemia within 1-2 weeks   3. Thyroid u/s within 7-10 days.     At SNF:   6/15: Acute muscle spasms, worsening pain and constipation. X ray done (no change), oxy changed to 5 mg TID and 10 HS and 5  q 4 prn, Start Flexeril 10 q HS and 10 BID PRN   6/22: Trialing off O2, coccyx ulcer    F/u today showing occ desating with Room air. IS machine broke, but pt was using it and trying deep breath exercises. States still having sig muscle spasms with involuntary foot/leg movement.   Wound on thigh healing but coccyx ulcer still present     Past Medical and Surgical History reviewed in Epic today.  MEDICATIONS: Reviewed.    REVIEW OF SYSTEMS: 4 point ROS including Respiratory, CV, GI and , other than that noted in the HPI,  is negative  Objective: /63   Pulse 78   Temp 97.3  F (36.3  C)   Resp 18   Wt 120.1 kg (264 lb 11.2 oz)   SpO2 93%   BMI 34.93 kg/m    Limited visit exam done given COVID-19 precautions.   GENERAL APPEARANCE: Alert, in no distress, cooperative.  ENT: Mouth and posterior oropharynx normal, moist mucous membranes, hearing acuity functional  RESP: non labored breathing- no coughing  CV: lower extremity edema : none in either leg  SKIN: Inspection of skin and subcutaneous tissue intact except ulcer in coccyx  NEURO: 2-12 appear at baseline  PSYCH: Insight and judgement, memory baseline intact, affect and mood up-beat.    Labs:   Recent labs in [facility] reviewed by me today.     ASSESSMENT/PLAN:  Closed fracture of right tibia and fibula with routine healing, subsequent encounter and Muscle spasm  Will increase Flexeril to tid scheduled given pt still taking PRN and still occ having muscle spasms so severe it is causing involuntary leg movements.   - pain appears better controlled and pt  agreeable to trial decrease in scheduled oxy. - will decrease by 5 mg/day and decrease frequency of PRN- only using about -1-2/day prn     Decubitus ulcer of coccyx, unstageable (H)  Discussed with wound care RN at [facility] - start medihoney - dressing to help with slough    Essential hypertension  Lower than needed (and no edema) will stop diuretic, but if any edema - will restart and lower CCB    Orders:  1. discontinue all current Flexeril  2. Start Flexeril 10 mg po TID dx. Muscle spasm  3. Change Oxycodone to 5 mg po q am, noon and 10 mg po q HS and 5 mg po q 6 hrs PRN   4. discontinue hydrochlorothiazide  - wound care per wound RN.   - cont to try to wean off O2 and do lung exercises (nurses getting new IS for him).     If any edema or HTN increases, will restart hydrochlorothiazide.     Electronically signed by:  FLORIN Musa CNP     Video-Visit Details  Type of service:  Video Visit  Video End Time (time video stopped): 10:45  Distant Location (provider location):  [facility] [facility]             Sincerely,        FLORIN Musa CNP

## 2020-07-01 ASSESSMENT — MIFFLIN-ST. JEOR: SCORE: 2059.52

## 2020-07-02 NOTE — PROGRESS NOTES
[facility] [facility]  [place] Medical Record Number:  [id number]  Place of Service where encounter took place:  [facility]) [[id number]]  Chief Complaint   Patient presents with     Nursing Home Acute     HPI:    Madhav Kirk  is a 64 year old (1955), who is being seen today for an episodic care visit.  HPI information obtained from: facility chart records, facility staff, patient report and [facility] chart review. Today's concern is f/u muscle spasm and hypoxia.      BIll is rehabing s/p [facility] H[facility] stay 6/5 - 6/9 after fall at home and knee pain; W/u + for right proximal tib/fib fractures, RLE cellulitis and hypoxemia. Treated non surgical, NWB - placed in leg immobilizer with PT/OT referral. Incidental finding of CT chest PE study normal but did show incidental mediastinal mass and suggest pulmonary hypertension. Mass possibly thyroidal in nature -check u/s and Pul Eval as out patient -Consideration of bx vs PET scan.    PMH Of right sided hemiparesis secondary to intraparenchymal brain bleed,&  HTN; baseline his ability to ambulate is limited to 8-10 feet with a walker.   Current orders/plan:  1. F/u Ortho Dr. Hanson  2. Pulmonary for consult on mediastinal mass and hypoxemia within 1-2 weeks   3. Thyroid u/s as out patient      At SNF:   6/15: Acute muscle spasms, worsening pain and constipation. X ray done (no change), oxy changed to 5 mg TID and 10 HS and 5  q 4 prn, Start Flexeril 10 q HS and 10 BID PRN   6/22: Trialing off O2, coccyx ulcer  6/29: Change flexeril to 10 TID scheduled; Decrease oxycodone to Oxycodone to 5 mg po q am, noon and 10 mg po q HS and 5 mg po q 6 hrs PRN , and discontinue hydrochlorothiazide 2/2 low bp's      F/u today showing slight increase in R lower ext edema off hydrochlorothiazide, unable to wean off O2 with sats 85% RA - IS still low at 500. Still + muscle spasms on R leg - mostly at night.  States pain is tolerable - but  "the muscle spams are the worst.     Past Medical and Surgical History reviewed in Epic today.  MEDICATIONS: Reviewed.    REVIEW OF SYSTEMS:  4 point ROS including Respiratory, CV, GI and , other than that noted in the HPI,  is negative    Objective:  /61   Pulse 82   Temp 97.5  F (36.4  C)   Resp 20   Ht 1.854 m (6' 1\")   Wt 121.6 kg (268 lb)   SpO2 91%   BMI 35.36 kg/m    Exam:  GENERAL APPEARANCE:  Alert, in no distress  RESP:  respiratory effort and palpation of chest normal, auscultation of lungs clear but diminished on R lower lobe , no respiratory distress  CV:  Palpation and auscultation of heart done , rate and rhythm reg, no murmur, trace R lower ext peripheral edema - not pitting.   ABDOMEN:  normal bowel sounds, soft, nontender, no hepatosplenomegaly or other masses  M/S:   Gait and station NWB on RLE - w/c and imobilizer in palce, Digits and nails long but not hypertrophic  SKIN:  Inspection and Palpation of skin and subcutaneous tissue intact - coccyx not viewed.   NEURO: 2-12 in normal limits and at patient's baseline  PSYCH:  insight and judgement, memory intact , affect and mood Pleasant    Labs:   Recent labs in [facility] reviewed by me today.     ASSESSMENT/PLAN:  Closed fracture of right tibia and fibula with routine healing, subsequent encounter and Muscle spasm  Muscle spasm only on R leg - not L - leading it to be more r/t fracture than systemic reasons (electrolyte) -   Will trial PRN vistaril, but decrease oxy goal continues and will decrease by 2.5 mg/day - pt agrees.   Will also add CA/Mag as may be helpful - certainly not harmful.     Hypoxia  Still unknown if incidental finding - pt very asymptomatic when on RA and sats in mid 80's - can be brought up to 97% with deep breathing exercises, but falls again at baseline breathing or talking on exam with me today.   Will look to plan for home O2    Thyroid mass  Having difficulty f/u during SNF stay given mobility and cost of " transport - will wait for out patient    Decubitus ulcer of coccyx, unstageable (H)  nsg treatment - goal to heal by SNF discharge but unclear when insurance will not allow continuation of stay. - cont poc.     HTN/Edema  Stable off hydrochlorothiazide -cont to monitor.       Orders written by provider at facility  1. SW to plan for home O2 - dx hypoxia  2. discontinue current oxycodone when below orders are started:  3. Start oxycodone 2.5 mg po TID - 8, 11 and 15:00 and 10 mg po q HS and 2.5 mg po q 6 hrs prn   4. Start vistaril 25 mg po q 6 hrs prn x 14 d  muscle spasm  5. Start [place]/Mag per pharm 1 tab po q HS. Dx muscle spasm.     Electronically signed by:  FLORIN Musa CNP

## 2020-07-03 ENCOUNTER — NURSING HOME VISIT (OUTPATIENT)
Dept: GERIATRICS | Facility: CLINIC | Age: 65
End: 2020-07-03
Payer: COMMERCIAL

## 2020-07-03 VITALS
BODY MASS INDEX: 35.52 KG/M2 | WEIGHT: 268 LBS | DIASTOLIC BLOOD PRESSURE: 61 MMHG | HEIGHT: 73 IN | RESPIRATION RATE: 20 BRPM | OXYGEN SATURATION: 91 % | SYSTOLIC BLOOD PRESSURE: 103 MMHG | HEART RATE: 82 BPM | TEMPERATURE: 97.5 F

## 2020-07-03 DIAGNOSIS — S82.201D CLOSED FRACTURE OF RIGHT TIBIA AND FIBULA WITH ROUTINE HEALING, SUBSEQUENT ENCOUNTER: ICD-10-CM

## 2020-07-03 DIAGNOSIS — E07.9 THYROID MASS: ICD-10-CM

## 2020-07-03 DIAGNOSIS — L89.150 DECUBITUS ULCER OF COCCYX, UNSTAGEABLE (H): ICD-10-CM

## 2020-07-03 DIAGNOSIS — S82.401D CLOSED FRACTURE OF RIGHT TIBIA AND FIBULA WITH ROUTINE HEALING, SUBSEQUENT ENCOUNTER: ICD-10-CM

## 2020-07-03 DIAGNOSIS — M62.838 MUSCLE SPASM: Primary | ICD-10-CM

## 2020-07-03 DIAGNOSIS — R09.02 HYPOXIA: ICD-10-CM

## 2020-07-03 PROCEDURE — 99309 SBSQ NF CARE MODERATE MDM 30: CPT | Performed by: NURSE PRACTITIONER

## 2020-07-03 RX ORDER — OXYCODONE HYDROCHLORIDE 5 MG/1
TABLET ORAL
Qty: 38 TABLET | Refills: 0 | Status: SHIPPED | OUTPATIENT
Start: 2020-07-03 | End: 2020-07-08

## 2020-07-03 RX ORDER — HYDROXYZINE PAMOATE 25 MG/1
25 CAPSULE ORAL 4 TIMES DAILY PRN
Qty: 30 CAPSULE | Refills: 0 | Status: SHIPPED | OUTPATIENT
Start: 2020-07-03 | End: 2020-07-13

## 2020-07-03 NOTE — LETTER
7/3/2020        RE: Madhav [place]arn[place] [address]        [place] [facility]  [address] Medical Record Number:  [id number]  Place of Service where encounter took place:  [facility] ([facility]) [[id number]]  Chief Complaint   Patient presents with     Nursing Home Acute     HPI:    Madhav Kirk  is a 64 year old (1955), who is being seen today for an episodic care visit.  HPI information obtained from: facility chart records, facility staff, patient report and [facility] chart review. Today's concern is f/u muscle spasm and hypoxia.      BIll is rehabing s/p [facility] H[facility] stay 6/5 - 6/9 after fall at home and knee pain; W/u + for right proximal tib/fib fractures, RLE cellulitis and hypoxemia. Treated non surgical, NWB - placed in leg immobilizer with PT/OT referral. Incidental finding of CT chest PE study normal but did show incidental mediastinal mass and suggest pulmonary hypertension. Mass possibly thyroidal in nature -check u/s and Pul Eval as out patient -Consideration of bx vs PET scan.    PMH Of right sided hemiparesis secondary to intraparenchymal brain bleed,&  HTN; baseline his ability to ambulate is limited to 8-10 feet with a walker.   Current orders/plan:  1. F/u Ortho Dr. Hanson  2. Pulmonary for consult on mediastinal mass and hypoxemia within 1-2 weeks   3. Thyroid u/s as out patient      At SNF:   6/15: Acute muscle spasms, worsening pain and constipation. X ray done (no change), oxy changed to 5 mg TID and 10 HS and 5  q 4 prn, Start Flexeril 10 q HS and 10 BID PRN   6/22: Trialing off O2, coccyx ulcer  6/29: Change flexeril to 10 TID scheduled; Decrease oxycodone to Oxycodone to 5 mg po q am, noon and 10 mg po q HS and 5 mg po q 6 hrs PRN , and discontinue hydrochlorothiazide 2/2 low bp's      F/u today showing slight increase in R lower ext edema off hydrochlorothiazide, unable to wean off O2 with sats 85% RA - IS still  "low at 500. Still + muscle spasms on R leg - mostly at night.  States pain is tolerable - but the muscle spams are the worst.     Past Medical and Surgical History reviewed in Epic today.  MEDICATIONS: Reviewed.    REVIEW OF SYSTEMS:  4 point ROS including Respiratory, CV, GI and , other than that noted in the HPI,  is negative    Objective:  /61   Pulse 82   Temp 97.5  F (36.4  C)   Resp 20   Ht 1.854 m (6' 1\")   Wt 121.6 kg (268 lb)   SpO2 91%   BMI 35.36 kg/m    Exam:  GENERAL APPEARANCE:  Alert, in no distress  RESP:  respiratory effort and palpation of chest normal, auscultation of lungs clear but diminished on R lower lobe , no respiratory distress  CV:  Palpation and auscultation of heart done , rate and rhythm reg, no murmur, trace R lower ext peripheral edema - not pitting.   ABDOMEN:  normal bowel sounds, soft, nontender, no hepatosplenomegaly or other masses  M/S:   Gait and station NWB on RLE - w/c and imobilizer in palce, Digits and nails long but not hypertrophic  SKIN:  Inspection and Palpation of skin and subcutaneous tissue intact - coccyx not viewed.   NEURO: 2-12 in normal limits and at patient's baseline  PSYCH:  insight and judgement, memory intact , affect and mood Pleasant    Labs:   Recent labs in [facility] reviewed by me today.     ASSESSMENT/PLAN:  Closed fracture of right tibia and fibula with routine healing, subsequent encounter and Muscle spasm  Muscle spasm only on R leg - not L - leading it to be more r/t fracture than systemic reasons (electrolyte) -   Will trial PRN vistaril, but decrease oxy goal continues and will decrease by 2.5 mg/day - pt agrees.   Will also add CA/Mag as may be helpful - certainly not harmful.     Hypoxia  Still unknown if incidental finding - pt very asymptomatic when on RA and sats in mid 80's - can be brought up to 97% with deep breathing exercises, but falls again at baseline breathing or talking on exam with me today.   Will look to plan for " home O2    Thyroid mass  Having difficulty f/u during SNF stay given mobility and cost of transport - will wait for out patient    Decubitus ulcer of coccyx, unstageable (H)  nsg treatment - goal to heal by SNF discharge but unclear when insurance will not allow continuation of stay. - cont poc.     HTN/Edema  Stable off hydrochlorothiazide -cont to monitor.       Orders written by provider at facility  1. SW to plan for home O2 - dx hypoxia  2. discontinue current oxycodone when below orders are started:  3. Start oxycodone 2.5 mg po TID - 8, 11 and 15:00 and 10 mg po q HS and 2.5 mg po q 6 hrs prn   4. Start vistaril 25 mg po q 6 hrs prn x 14 d  muscle spasm  5. Start Freedom/Mag per pharm 1 tab po q HS. Dx muscle spasm.     Electronically signed by:  FLORIN Musa CNP             Sincerely,        FLORIN Musa CNP

## 2020-07-08 VITALS
SYSTOLIC BLOOD PRESSURE: 97 MMHG | BODY MASS INDEX: 35.36 KG/M2 | WEIGHT: 268 LBS | TEMPERATURE: 97.5 F | OXYGEN SATURATION: 93 % | RESPIRATION RATE: 16 BRPM | HEART RATE: 79 BPM | DIASTOLIC BLOOD PRESSURE: 58 MMHG

## 2020-07-08 DIAGNOSIS — S82.401D CLOSED FRACTURE OF RIGHT TIBIA AND FIBULA WITH ROUTINE HEALING, SUBSEQUENT ENCOUNTER: ICD-10-CM

## 2020-07-08 DIAGNOSIS — S82.201D CLOSED FRACTURE OF RIGHT TIBIA AND FIBULA WITH ROUTINE HEALING, SUBSEQUENT ENCOUNTER: ICD-10-CM

## 2020-07-08 RX ORDER — OXYCODONE HYDROCHLORIDE 5 MG/1
TABLET ORAL
Qty: 36 TABLET | Refills: 0 | Status: SHIPPED | OUTPATIENT
Start: 2020-07-08 | End: 2020-07-13

## 2020-07-09 ENCOUNTER — NURSING HOME VISIT (OUTPATIENT)
Dept: GERIATRICS | Facility: CLINIC | Age: 65
End: 2020-07-09
Payer: COMMERCIAL

## 2020-07-09 DIAGNOSIS — R09.02 HYPOXIA: Primary | ICD-10-CM

## 2020-07-09 DIAGNOSIS — J18.9 PNEUMONIA OF LEFT LOWER LOBE DUE TO INFECTIOUS ORGANISM: ICD-10-CM

## 2020-07-09 DIAGNOSIS — I10 ESSENTIAL HYPERTENSION: ICD-10-CM

## 2020-07-09 DIAGNOSIS — M62.838 MUSCLE SPASM: ICD-10-CM

## 2020-07-09 PROCEDURE — 99309 SBSQ NF CARE MODERATE MDM 30: CPT | Performed by: NURSE PRACTITIONER

## 2020-07-09 NOTE — LETTER
7/9/2020        RE: Madhav [place] [address]        [place] [facility]  [place] Medical Record Number:  [id number]  Place [facility] encounter took place[facility] ([facility]) [[id number]]  Chief Complaint   Patient presents with     RECHECK     HPI:    Madhav Kirk  is a 64 year old (1955), who is being seen today for an episodic care visit.  HPI information obtained from: facility chart records, facility staff, patient report and [facility] chart review. Today's concern is f/u on hypoxia and pain.    BIll is rehabing s/p [facility] Hosp stay 6/5 - 6/9 after fall at home and knee pain; W/u + for right proximal tib/fib fractures, RLE cellulitis and hypoxemia. Treated non surgical, NWB - placed in leg immobilizer with PT/OT referral. Incidental finding of CT chest PE study normal but did show incidental mediastinal mass and suggest pulmonary hypertension. Mass possibly thyroidal in nature -check u/s and Pul Eval as out patient -Consideration of bx vs PET scan.    PMH Of right sided hemiparesis secondary to intraparenchymal brain bleed,&  HTN; baseline his ability to ambulate is limited to 8-10 feet with a walker.   Current orders/plan:  1. F/u Ortho Dr. Hanson  2. Pulmonary for consult on mediastinal mass and hypoxemia within 1-2 weeks   3. Thyroid u/s as out patient      At SNF:   6/15: Acute muscle spasms, worsening pain and constipation. X ray done (no change), oxy changed to 5 mg TID and 10 HS and 5  q 4 prn, Start Flexeril 10 q HS and 10 BID PRN   6/22: Trialing off O2, coccyx ulcer  6/29: Change flexeril to 10 TID scheduled; Decrease oxycodone to Oxycodone to 5 mg po q am, noon and 10 mg po q HS and 5 mg po q 6 hrs PRN , and discontinue hydrochlorothiazide 2/2 low bp's  7/3:  Decrease oxy to 2.5 TID and 10 HS and 2.5 q 6 prn, start vistaril and ca/mag supplement for muscle spasm.   7/9: Ortho apt - new brace        F/u  today showing on going hypoxia and exam seems more diminished lung sounds, Sig muscle spasm - vistaril prn only used for the first time last night.     Past Medical and Surgical History reviewed in Epic today.    MEDICATIONS: Reviewed.    REVIEW OF SYSTEMS:  4 point ROS including Respiratory, CV, GI and , other than that noted in the HPI,  is negative    Objective:  BP 97/58   Pulse 79   Temp 97.5  F (36.4  C)   Resp 16   Wt 121.6 kg (268 lb)   SpO2 93%   BMI 35.36 kg/m    Exam:  GENERAL APPEARANCE:  Alert, in no distress  RESP:  respiratory effort and palpation of chest normal, auscultation of lungs diminished but no ronchi , no respiratory distress  CV:  Palpation and auscultation of heart done , rate and rhythm reg, no murmur, trace geeta lower peripheral edema  ABDOMEN:  normal bowel sounds, soft, nontender, no hepatosplenomegaly or other masses  M/S:   Gait and station NWB on R leg, Digits and nails intact  SKIN:  Inspection and Palpation of skin and subcutaneous tissue intact  NEURO: 2-12 in normal limits and at patient's baseline  PSYCH:  insight and judgement, memory intact , affect and mood Pleasant     Labs:   Recent labs in [facility] reviewed by me today.        ASSESSMENT/PLAN:  Hypoxia and Pneumonia of left lower lobe due to infectious organism  Ordered CXR  - see above   Start levaquin  Called 7/10 - and spoke with pt about abx and o2 and POC    Muscle spasm  + this am but improved t/o day  Fracture- f/u with Ortho today -     Essential hypertension  Lower than needed - will discontinue CCB - f/u     Orders written by provider at facility  1. CXR  2. CXR back - start levaquin 750 mg po q day x 7  3. 7/10 - f/u on HTN - still low - discontinue norvasc.     Electronically signed by:  FLORIN Muas CNP               Sincerely,        FLORIN Musa CNP

## 2020-07-09 NOTE — PROGRESS NOTES
[place] [facility]  [place] Medical Record Number:  [id number]  Place of Service where encounter took place:  [facility] ([facility]) [[id number]]  Chief Complaint   Patient presents with     RECHECK     HPI:    Madhav Kirk  is a 64 year old (1955), who is being seen today for an episodic care visit.  HPI information obtained from: facility chart records, facility staff, patient report and [facility] chart review. Today's concern is f/u on hypoxia and pain.    BIll is rehabing s/p [facility] Hosp stay 6/5 - 6/9 after fall at home and knee pain; W/u + for right proximal tib/fib fractures, RLE cellulitis and hypoxemia. Treated non surgical, NWB - placed in leg immobilizer with PT/OT referral. Incidental finding of CT chest PE study normal but did show incidental mediastinal mass and suggest pulmonary hypertension. Mass possibly thyroidal in nature -check u/s and Pul Eval as out patient -Consideration of bx vs PET scan.    PMH Of right sided hemiparesis secondary to intraparenchymal brain bleed,&  HTN; baseline his ability to ambulate is limited to 8-10 feet with a walker.   Current orders/plan:  1. F/u Ortho Dr. Hanson  2. Pulmonary for consult on mediastinal mass and hypoxemia within 1-2 weeks   3. Thyroid u/s as out patient      At SNF:   6/15: Acute muscle spasms, worsening pain and constipation. X ray done (no change), oxy changed to 5 mg TID and 10 HS and 5  q 4 prn, Start Flexeril 10 q HS and 10 BID PRN   6/22: Trialing off O2, coccyx ulcer  6/29: Change flexeril to 10 TID scheduled; Decrease oxycodone to Oxycodone to 5 mg po q am, noon and 10 mg po q HS and 5 mg po q 6 hrs PRN , and discontinue hydrochlorothiazide 2/2 low bp's  7/3:  Decrease oxy to 2.5 TID and 10 HS and 2.5 q 6 prn, start vistaril and ca/mag supplement for muscle spasm.   7/9: Ortho apt - new brace        F/u today showing on going hypoxia and exam seems more diminished lung sounds, Sig muscle spasm -  vistaril prn only used for the first time last night.     Past Medical and Surgical History reviewed in Epic today.    MEDICATIONS: Reviewed.    REVIEW OF SYSTEMS:  4 point ROS including Respiratory, CV, GI and , other than that noted in the HPI,  is negative    Objective:  BP 97/58   Pulse 79   Temp 97.5  F (36.4  C)   Resp 16   Wt 121.6 kg (268 lb)   SpO2 93%   BMI 35.36 kg/m    Exam:  GENERAL APPEARANCE:  Alert, in no distress  RESP:  respiratory effort and palpation of chest normal, auscultation of lungs diminished but no ronchi , no respiratory distress  CV:  Palpation and auscultation of heart done , rate and rhythm reg, no murmur, trace geeta lower peripheral edema  ABDOMEN:  normal bowel sounds, soft, nontender, no hepatosplenomegaly or other masses  M/S:   Gait and station NWB on R leg, Digits and nails intact  SKIN:  Inspection and Palpation of skin and subcutaneous tissue intact  NEURO: 2-12 in normal limits and at patient's baseline  PSYCH:  insight and judgement, memory intact , affect and mood Pleasant     Labs:   Recent labs in [facility] reviewed by me today.        ASSESSMENT/PLAN:  Hypoxia and Pneumonia of left lower lobe due to infectious organism  Ordered CXR  - see above   Start levaquin  Called 7/10 - and spoke with pt about abx and o2 and POC    Muscle spasm  + this am but improved t/o day  Fracture- f/u with Ortho today -     Essential hypertension  Lower than needed - will discontinue CCB - f/u     Orders written by provider at facility  1. CXR  2. CXR back - start levaquin 750 mg po q day x 7  3. 7/10 - f/u on HTN - still low - discontinue norvasc.     Electronically signed by:  FLORIN Musa CNP

## 2020-07-10 RX ORDER — LEVOFLOXACIN 750 MG/1
750 TABLET, FILM COATED ORAL DAILY
COMMUNITY
Start: 2020-07-09 | End: 2020-07-16

## 2020-07-12 ENCOUNTER — TELEPHONE (OUTPATIENT)
Dept: GERIATRICS | Facility: CLINIC | Age: 65
End: 2020-07-12

## 2020-07-12 NOTE — TELEPHONE ENCOUNTER
Patient with redness around a scabbed area on left shin - No noted pain, afebrile, VSS.     Requested nursing outline redness and update primary NP tomorrow on wether or not it's extended beyond today's markings.    Dr. Tamy Burks, APRN, DNP, A/GNP-[facility]  [address] [place] [address] [address]  [place] [address]     Cell: [phone]  Fax: [phone]  Email: [email or web address]

## 2020-07-13 ENCOUNTER — NURSING HOME VISIT (OUTPATIENT)
Dept: GERIATRICS | Facility: CLINIC | Age: 65
End: 2020-07-13
Payer: COMMERCIAL

## 2020-07-13 VITALS
DIASTOLIC BLOOD PRESSURE: 69 MMHG | TEMPERATURE: 97.9 F | RESPIRATION RATE: 16 BRPM | WEIGHT: 268 LBS | BODY MASS INDEX: 35.36 KG/M2 | OXYGEN SATURATION: 92 % | HEART RATE: 83 BPM | SYSTOLIC BLOOD PRESSURE: 120 MMHG

## 2020-07-13 DIAGNOSIS — R09.02 HYPOXIA: ICD-10-CM

## 2020-07-13 DIAGNOSIS — J18.9 PNEUMONIA OF LEFT LOWER LOBE DUE TO INFECTIOUS ORGANISM: ICD-10-CM

## 2020-07-13 DIAGNOSIS — L89.150 DECUBITUS ULCER OF COCCYX, UNSTAGEABLE (H): ICD-10-CM

## 2020-07-13 DIAGNOSIS — S82.401D CLOSED FRACTURE OF RIGHT TIBIA AND FIBULA WITH ROUTINE HEALING, SUBSEQUENT ENCOUNTER: Primary | ICD-10-CM

## 2020-07-13 DIAGNOSIS — I10 ESSENTIAL HYPERTENSION: ICD-10-CM

## 2020-07-13 DIAGNOSIS — E07.9 THYROID MASS: ICD-10-CM

## 2020-07-13 DIAGNOSIS — L97.211 SKIN ULCER OF RIGHT CALF, LIMITED TO BREAKDOWN OF SKIN (H): ICD-10-CM

## 2020-07-13 DIAGNOSIS — K59.03 DRUG-INDUCED CONSTIPATION: ICD-10-CM

## 2020-07-13 DIAGNOSIS — M62.838 MUSCLE SPASM: ICD-10-CM

## 2020-07-13 DIAGNOSIS — S82.201D CLOSED FRACTURE OF RIGHT TIBIA AND FIBULA WITH ROUTINE HEALING, SUBSEQUENT ENCOUNTER: Primary | ICD-10-CM

## 2020-07-13 PROCEDURE — 99316 NF DSCHRG MGMT 30 MIN+: CPT | Performed by: NURSE PRACTITIONER

## 2020-07-13 RX ORDER — HYDROCHLOROTHIAZIDE 50 MG/1
50 TABLET ORAL DAILY
Start: 2020-07-13

## 2020-07-13 RX ORDER — POLYETHYLENE GLYCOL 3350 17 G/17G
1 POWDER, FOR SOLUTION ORAL DAILY
Qty: 1 BOTTLE | Refills: 0 | Status: SHIPPED | OUTPATIENT
Start: 2020-07-13

## 2020-07-13 RX ORDER — OXYCODONE HYDROCHLORIDE 5 MG/1
TABLET ORAL
Qty: 60 TABLET | Refills: 0 | Status: SHIPPED | OUTPATIENT
Start: 2020-07-13

## 2020-07-13 RX ORDER — SENNOSIDES A AND B 8.6 MG/1
3 TABLET, FILM COATED ORAL 2 TIMES DAILY
Qty: 90 TABLET | Refills: 1 | Status: SHIPPED | OUTPATIENT
Start: 2020-07-13

## 2020-07-13 RX ORDER — CYCLOBENZAPRINE HCL 10 MG
10 TABLET ORAL 3 TIMES DAILY
Qty: 90 TABLET | Refills: 0 | Status: SHIPPED | OUTPATIENT
Start: 2020-07-13

## 2020-07-13 NOTE — PROGRESS NOTES
[place] [facility] DISCHARGE SUMMARY  PATIENT'S NAME: Madhav Kirk  YOB: 1955  MEDICAL RECORD NUMBER:  [id number]  Place of Service where encounter took place:  [facility] [place] ([facility]) [[id number]]    PRIMARY CARE PROVIDER AND CLINIC RESPONSIBLE AFTER TRANSFER:   Nikolai Good MD, [facility] [address] / [place] [address]*    Non-FMG Provider     Transferring providers: FLORIN Musa CNP, eNgro Bobo MD  Recent Hospitalization/ED:  [facility] stay 6/5 to 6/9.  Date of SNF Admission: June / 09 / 2020  Date of SNF (anticipated) Discharge: July / 13 / 2020  Discharged to: previous independent home  Cognitive Scores: BIMS: 14/15 and SLUMS: 19/30  Physical Function: Wheelchair dependent  DME: O2  CODE STATUS/ADVANCE DIRECTIVES DISCUSSION:  Full Code ALLERGIES: Patient has no known allergies.  DISCHARGE DIAGNOSIS/NURSING FACILITY COURSE:    BIll is rehabing s/p [facility] Hosp stay 6/5 - 6/9 after fall at home and knee pain; W/u + for right proximal tib/fib fractures, RLE cellulitis and hypoxemia. Treated non surgical, NWB - placed in leg immobilizer with PT/OT referral. Incidental finding of CT chest PE study normal but did show incidental mediastinal mass and suggest pulmonary hypertension. Mass possibly thyroidal in nature -check u/s and Pul Eval as out patient -Consideration of bx vs PET scan.    PMH Of right sided hemiparesis secondary to intraparenchymal brain bleed,&  HTN; baseline his ability to ambulate is limited to 8-10 feet with a walker.   Current orders/plan:  1. F/u Ortho Dr. Hanson  2. Pulmonary for consult on mediastinal mass and hypoxemia within 1-2 weeks   3. Thyroid u/s as out patient      At SNF:   6/15: Acute muscle spasms, worsening pain and constipation. X ray done (no change), oxy changed to 5 mg TID and 10 HS and 5  q 4 prn, Start Flexeril 10 q HS and 10 BID PRN   6/22: Trialing off O2, coccyx  ulcer  6/29: Change flexeril to 10 TID scheduled; Decrease oxycodone to Oxycodone to 5 mg po q am, noon and 10 mg po q HS and 5 mg po q 6 hrs PRN , and discontinue hydrochlorothiazide 2/2 low bp's  7/3:  Decrease oxy to 2.5 TID and 10 HS and 2.5 q 6 prn, start vistaril and ca/mag supplement for muscle spasm.   7/9: Ortho apt - new brace  7/9: CXR  + infiltrates, started on levaquin 750 q day x 7, DC'd Norvasc due to low bp's    F/u today showing sig increase in geeta lower ext edema, new redness on R lower leg. Muscle spasms and pain stable.     Darryl is discharging today despite medical complexity and need for higher level of care due to co-pay.  He will need close f/u with PCP and f/u with Pulmonology as previously ordered.     Closed fracture of right tibia and fibula with routine healing, subsequent encounter and Muscle spasm  Today muscle spasms are stable - we are stopping vistaril at SNF discharge - but cont PTA KEAGAN and balcolfen with added flexeril, and oxy.   F/u with PCP and ortho    New redness on R lower leg due to Edema   Will give lasix 40 x 1 now then restart hydrochlorothiazide but at 50 mg vs PTA 25 mg. Only trace edema last week - sig increase.   PCP to f/u in 1 week.   -does not appear cellulitis and already on levaquin given lungs.       Pneumonia of left lower lobe due to infectious organism   and Hypoxia  needingO2 - 84% RA and lower unsure if this is only due to pnuemonia - it has been since SNF admission and suspect there is baseline disease that needs to be assessed by PCP/PUl - f/u with pul/PCP   [place] O2 and IS use.     Essential hypertension   stable off norvasc - but I am restarting hydrochlorothiazide and at 100% dose increase given edema.     Decubitus ulcer of coccyx, unstageable (H)  Improving per wound care nurse   She educated family on position changes and cont use of Triad    Skin ulcer of right calf, limited to breakdown of skin (H)  See pic above - healing    Thyroid mass  F/u  with PCP and ultrasound.     Past Medical History: reviewed in [facility] everywhere chart.     Discharge Medications:  Current Outpatient Medications   Medication Sig Dispense Refill     Calcium-Magnesium-Zinc 333-133-5 MG TABS per tablet Take 1 tablet by mouth At Bedtime 30 tablet 0     cyclobenzaprine (FLEXERIL) 10 MG tablet Take 1 tablet (10 mg) by mouth 3 times daily 90 tablet 0     hydrochlorothiazide (HYDRODIURIL) 50 MG tablet Take 1 tablet (50 mg) by mouth daily       oxyCODONE (ROXICODONE) 5 MG tablet 1/2 TABLET (2.5MG) BY MOUTH THREE TIMES DAILY;2 TABLETS (10MG) BY MOUTH AT BEDTIME;1/2 TABLET (2.5MG) BY MOUTH EVERY 6 HOURS AS NEEDED DX PAIN - decrease amount as able 60 tablet 0     polyethylene glycol (MIRALAX) 17 GM/SCOOP powder Take 17 g (1 capful) by mouth daily 1 Bottle 0     senna (SENOKOT) 8.6 MG tablet Take 3 tablets by mouth 2 times daily And 1 tab po BID PRN 90 tablet 1     acetaminophen (TYLENOL) 500 MG tablet Take 1,000 mg by mouth 4 times daily       aspirin 81 MG EC tablet Take 81 mg by mouth daily       clotrimazole (LOTRIMIN) 1 % external cream Apply topically 2 times daily as needed       Cyanocobalamin (VITAMIN B 12 PO) Take 1 capsule by mouth daily       gabapentin (NEURONTIN) 300 MG capsule Take 300 mg by mouth 3 times daily Administer Between- 7:00-10:00, 11:00-14:00, 19:00-23:00       levofloxacin (LEVAQUIN) 750 MG tablet Take 750 mg by mouth daily       multivitamin w/minerals (THERA-VIT-M) tablet Take 1 tablet by mouth daily       venlafaxine (EFFEXOR-XR) 150 MG 24 hr capsule Take 150 mg by mouth daily (with dinner)       Medication Changes/Rationale:     See above in HPI    Controlled medications sent with patient:   Medication oxycodone  electronically prescribed to [facility] in hospitals pharmacy     ROS:   4 point ROS including Respiratory, CV, GI and , other than that noted in the HPI,  is negative    Physical Exam:   Vitals: /69   Pulse 83   Temp 97.9  F (36.6  C)    Resp 16   Wt 121.6 kg (268 lb)   SpO2 92%   BMI 35.36 kg/m    BMI= Body mass index is 35.36 kg/m .  GENERAL APPEARANCE:  Alert, in no distress  RESP:  respiratory effort and palpation of chest normal, auscultation of lungs diminished and geeta lower fine crackles , no respiratory distress  CV:  Palpation and auscultation of heart done , rate and rhythm reg, no murmur, new +2 geeta lower ext peripheral edema  ABDOMEN:  normal bowel sounds, soft, nontender, no hepatosplenomegaly or other masses  M/S:   Gait and station NWB - WC dep, Digits and nails intact  SKIN:  Inspection and Palpation of skin and subcutaneous tissue Coccyx ucler not viewed by me - R leg new redness- marked - less than yesterday - likely due to edema - see pic above.   NEURO: 2-12 in normal limits and at patient's baseline  PSYCH:  insight and judgement, memory intact , affect and mood Pleasant        SNF labs: no labs done at SNF except CXR    DISCHARGE PLAN:    Follow up labs: recommend PCP to f/u BMP given restarting HCTZ    Medical Follow Up:      Follow up with primary care provider in 1 weeks  Follow up with specialist Pulmonary     MTM referral needed and placed by this provider: No    Discharge Services: none per pt's request    Discharge Instructions Verbalized to Patient at Discharge:     Monitor redness on R lower leg - if worsen call PCP    Monitor edema in geeta lower ext - if worsen - call pcp    F/u with Pul    F/u with U/S on thyroid    TOTAL DISCHARGE TIME:   Greater than 30 minutes  Electronically signed by:  FLORIN Musa CNP       Face to Face and Medical Necessity Statement for DME Provider visit    Demographic Information on Madhav Kirk:  Gender: male  : 1955  [address] [place] [address] [phone] (home) [phone] (work)    Medical Record: [id number]  Social Security Number: [id number]  Primary Care Provider: Nikolai Good  Insurance: Payor: HU[employer] / Plan: HU[employer] MEDICARE  ADVANTAGE / Product Type: Medicare /     HPI:   Madhav Kirk is a 64 year old  (1955), who is being seen today for a face to face provider visit at [facility]; medical necessity statement for DME included. This patient requires the following:  DME Ordered and Medical Necessity Statement   Oxygen: 2 L/min via NC continuous ; Clinical Documentation: (Obtain documented RA sat with in 2 days of discharge in acute setting or within 30 days of provider visit):  Method 1: Room air at rest: Qualifing sat level is < 88% or below on room air at rest on 7/13 date      Pt needing above DME with expected length of need of 6 months     due to medical necessity associated with following diagnosis:    Pneumonia of left lower lobe due to infectious organism  Hypoxia of unknown reason.      Orders:  1. Facility staff/TC to contact DME company to get their order form for provider to fill out    ELECTRONICALLY SIGNED BY BRANDO CERTIFIED PROVIDER:  FLORIN Musa CNP   NPI: [id number]   [place] [facility]  [address] [place] [address], [address]  [place] [id number]

## 2020-07-13 NOTE — LETTER
7/13/2020        RE: Madhav Kirk  [address]  [facility] [address]        [facility] DISCHARGE SUMMARY  PATIENT'S NAME: Madhav Kirk  YOB: 1955  MEDICAL RECORD NUMBER:  [id number]  Place of Service where encounter took place:  [facility] ([facility]) [[id number]]    PRIMARY CARE PROVIDER AND CLINIC RESPONSIBLE AFTER TRANSFER:   Nikolai Good MD, [facility] [address] [facility]*    Non-FMG Provider     Transferring providers: FLORIN Musa CNP, Negro Bobo MD  Recent Hospitalization/ED:  Hospital  [facility] stay 6/5 to 6/9.  Date of SNF Admission: June / 09 / 2020  Date of SNF (anticipated) Discharge: July / 13 / 2020  Discharged to: previous independent home  Cognitive Scores: BIMS: 14/15 and SLUMS: 19/30  Physical Function: Wheelchair dependent  DME: O2  CODE STATUS/ADVANCE DIRECTIVES DISCUSSION:  Full Code ALLERGIES: Patient has no known allergies.  DISCHARGE DIAGNOSIS/NURSING FACILITY COURSE:    BIll is rehabing s/p [facility] stay 6/5 - 6/9 after fall at home and knee pain; W/u + for right proximal tib/fib fractures, RLE cellulitis and hypoxemia. Treated non surgical, NWB - placed in leg immobilizer with PT/OT referral. Incidental finding of CT chest PE study normal but did show incidental mediastinal mass and suggest pulmonary hypertension. Mass possibly thyroidal in nature -check u/s and Pul Eval as out patient -Consideration of bx vs PET scan.    PMH Of right sided hemiparesis secondary to intraparenchymal brain bleed,&  HTN; baseline his ability to ambulate is limited to 8-10 feet with a walker.   Current orders/plan:  1. F/u Ortho Dr. Hanson  2. Pulmonary for consult on mediastinal mass and hypoxemia within 1-2 weeks   3. Thyroid u/s as out patient      At SNF:   6/15: Acute muscle spasms, worsening pain and constipation. X ray done (no change), oxy changed to 5 mg TID and  10 HS and 5  q 4 prn, Start Flexeril 10 q HS and 10 BID PRN   6/22: Trialing off O2, coccyx ulcer  6/29: Change flexeril to 10 TID scheduled; Decrease oxycodone to Oxycodone to 5 mg po q am, noon and 10 mg po q HS and 5 mg po q 6 hrs PRN , and discontinue hydrochlorothiazide 2/2 low bp's  7/3:  Decrease oxy to 2.5 TID and 10 HS and 2.5 q 6 prn, start vistaril and ca/mag supplement for muscle spasm.   7/9: Ortho apt - new brace  7/9: CXR  + infiltrates, started on levaquin 750 q day x 7, DC'd Norvasc due to low bp's    F/u today showing sig increase in geeta lower ext edema, new redness on R lower leg. Muscle spasms and pain stable.     Darryl is discharging today despite medical complexity and need for higher level of care due to co-pay.  He will need close f/u with PCP and f/u with Pulmonology as previously ordered.     Closed fracture of right tibia and fibula with routine healing, subsequent encounter and Muscle spasm  Today muscle spasms are stable - we are stopping vistaril at SNF discharge - but cont PTA KEAGAN and balcolfen with added flexeril, and oxy.   F/u with PCP and ortho    New redness on R lower leg due to Edema   Will give lasix 40 x 1 now then restart hydrochlorothiazide but at 50 mg vs PTA 25 mg. Only trace edema last week - sig increase.   PCP to f/u in 1 week.   -does not appear cellulitis and already on levaquin given lungs.       Pneumonia of left lower lobe due to infectious organism   and Hypoxia  needingO2 - 84% RA and lower unsure if this is only due to pnuemonia - it has been since SNF admission and suspect there is baseline disease that needs to be assessed by PCP/PUl - f/u with pul/PCP   [place] O2 and IS use.     Essential hypertension   stable off norvasc - but I am restarting hydrochlorothiazide and at 100% dose increase given edema.     Decubitus ulcer of coccyx, unstageable (H)  Improving per wound care nurse   She educated family on position changes and cont use of Triad    Skin ulcer  of right calf, limited to breakdown of skin (H)  See pic above - healing    Thyroid mass  F/u with PCP and ultrasound.     Past Medical History: reviewed in [facility] everywhere chart.     Discharge Medications:  Current Outpatient Medications   Medication Sig Dispense Refill     Calcium-Magnesium-Zinc 333-133-5 MG TABS per tablet Take 1 tablet by mouth At Bedtime 30 tablet 0     cyclobenzaprine (FLEXERIL) 10 MG tablet Take 1 tablet (10 mg) by mouth 3 times daily 90 tablet 0     hydrochlorothiazide (HYDRODIURIL) 50 MG tablet Take 1 tablet (50 mg) by mouth daily       oxyCODONE (ROXICODONE) 5 MG tablet 1/2 TABLET (2.5MG) BY MOUTH THREE TIMES DAILY;2 TABLETS (10MG) BY MOUTH AT BEDTIME;1/2 TABLET (2.5MG) BY MOUTH EVERY 6 HOURS AS NEEDED DX PAIN - decrease amount as able 60 tablet 0     polyethylene glycol (MIRALAX) 17 GM/SCOOP powder Take 17 g (1 capful) by mouth daily 1 Bottle 0     senna (SENOKOT) 8.6 MG tablet Take 3 tablets by mouth 2 times daily And 1 tab po BID PRN 90 tablet 1     acetaminophen (TYLENOL) 500 MG tablet Take 1,000 mg by mouth 4 times daily       aspirin 81 MG EC tablet Take 81 mg by mouth daily       clotrimazole (LOTRIMIN) 1 % external cream Apply topically 2 times daily as needed       Cyanocobalamin (VITAMIN B 12 PO) Take 1 capsule by mouth daily       gabapentin (NEURONTIN) 300 MG capsule Take 300 mg by mouth 3 times daily Administer Between- 7:00-10:00, 11:00-14:00, 19:00-23:00       levofloxacin (LEVAQUIN) 750 MG tablet Take 750 mg by mouth daily       multivitamin w/minerals (THERA-VIT-M) tablet Take 1 tablet by mouth daily       venlafaxine (EFFEXOR-XR) 150 MG 24 hr capsule Take 150 mg by mouth daily (with dinner)       Medication Changes/Rationale:     See above in HPI    Controlled medications sent with patient:   Medication oxycodone  electronically prescribed to [facility] in [facility] pharmacy     ROS:   4 point ROS including Respiratory, CV, GI and , other than that noted in the HPI,   is negative    Physical Exam:   Vitals: /69   Pulse 83   Temp 97.9  F (36.6  C)   Resp 16   Wt 121.6 kg (268 lb)   SpO2 92%   BMI 35.36 kg/m    BMI= Body mass index is 35.36 kg/m .  GENERAL APPEARANCE:  Alert, in no distress  RESP:  respiratory effort and palpation of chest normal, auscultation of lungs diminished and geeta lower fine crackles , no respiratory distress  CV:  Palpation and auscultation of heart done , rate and rhythm reg, no murmur, new +2 geeta lower ext peripheral edema  ABDOMEN:  normal bowel sounds, soft, nontender, no hepatosplenomegaly or other masses  M/S:   Gait and station NWB - WC dep, Digits and nails intact  SKIN:  Inspection and Palpation of skin and subcutaneous tissue Coccyx ucler not viewed by me - R leg new redness- marked - less than yesterday - likely due to edema - see pic above.   NEURO: 2-12 in normal limits and at patient's baseline  PSYCH:  insight and judgement, memory intact , affect and mood Pleasant        SNF labs: no labs done at SNF except CXR    DISCHARGE PLAN:    Follow up labs: recommend PCP to f/u BMP given restarting HCTZ    Medical Follow Up:      Follow up with primary care provider in 1 weeks  Follow up with specialist Pulmonary     MTM referral needed and placed by this provider: No    Discharge Services: none per pt's request    Discharge Instructions Verbalized to Patient at Discharge:     Monitor redness on R lower leg - if worsen call PCP    Monitor edema in geeta lower ext - if worsen - call pcp    F/u with Pul    F/u with U/S on thyroid    TOTAL DISCHARGE TIME:   Greater than 30 minutes  Electronically signed by:  FLORIN Musa CNP       Face to Face and Medical Necessity Statement for DME Provider visit    Demographic Information on Madhav Kirk:  Gender: male  : 1955  [address]  [place] [id number] [phone] (home) [phone] (work)    Medical Record: [id number]  Social Security Number:  [id number]  Primary Care Provider: Nikolai Good  Insurance: Payor: HUMANA / Plan: HUMANA MEDICARE ADVANTAGE / Product Type: Medicare /     HPI:   Madhav Kirk is a 64 year old  (1955), who is being seen today for a face to face provider visit at [facility]; medical necessity statement for DME included. This patient requires the following:  DME Ordered and Medical Necessity Statement   Oxygen: 2 L/min via NC continuous ; Clinical Documentation: (Obtain documented RA sat with in 2 days of discharge in acute setting or within 30 days of provider visit):  Method 1: Room air at rest: Qualifing sat level is < 88% or below on room air at rest on 7/13 date      Pt needing above DME with expected length of need of 6 months     due to medical necessity associated with following diagnosis:    Pneumonia of left lower lobe due to infectious organism  Hypoxia of unknown reason.      Orders:  1. Facility staff/TC to contact DME company to get their order form for provider to fill out    ELECTRONICALLY SIGNED BY BRANDO CERTIFIED PROVIDER:  FLORIN Musa CNP   NPI: [id number]   [facility]  [address] [place] [address], [address]  [place] [address]                        Sincerely,        FLORIN Musa CNP

## 2020-07-13 NOTE — LETTER
7/13/2020        RE: Madhav Kirk  [address]  [facility] [address]        [place] [facility] DISCHARGE SUMMARY  PATIENT'S NAME: Madhav Kirk  YOB: 1955  MEDICAL RECORD NUMBER:  [id number]  Place of Service where encounter took place:  [facility] ([facility]) [[id number]]    PRIMARY CARE PROVIDER AND CLINIC RESPONSIBLE AFTER TRANSFER:   Nikolai Good MD, [facility] [address] [facility] 55*    Non-FMG Provider     Transferring providers: FLORIN Musa CNP, Negro Bobo MD  Recent Hospitalization/ED:  Hospital  [facility] stay 6/5 to 6/9.  Date of SNF Admission: June / 09 / 2020  Date of SNF (anticipated) Discharge: July / 13 / 2020  Discharged to: previous independent home  Cognitive Scores: BIMS: 14/15 and SLUMS: 19/30  Physical Function: Wheelchair dependent  DME: O2  CODE STATUS/ADVANCE DIRECTIVES DISCUSSION:  Full Code ALLERGIES: Patient has no known allergies.  DISCHARGE DIAGNOSIS/NURSING FACILITY COURSE:    BIll is rehabing s/p [facility] Hosp stay 6/5 - 6/9 after fall at home and knee pain; W/u + for right proximal tib/fib fractures, RLE cellulitis and hypoxemia. Treated non surgical, NWB - placed in leg immobilizer with PT/OT referral. Incidental finding of CT chest PE study normal but did show incidental mediastinal mass and suggest pulmonary hypertension. Mass possibly thyroidal in nature -check u/s and Pul Eval as out patient -Consideration of bx vs PET scan.    PMH Of right sided hemiparesis secondary to intraparenchymal brain bleed,&  HTN; baseline his ability to ambulate is limited to 8-10 feet with a walker.   Current orders/plan:  1. F/u Ortho Dr. Hanson  2. Pulmonary for consult on mediastinal mass and hypoxemia within 1-2 weeks   3. Thyroid u/s as out patient      At SNF:   6/15: Acute muscle spasms, worsening pain and constipation. X ray done (no change), oxy changed to 5 mg TID and  10 HS and 5  q 4 prn, Start Flexeril 10 q HS and 10 BID PRN   6/22: Trialing off O2, coccyx ulcer  6/29: Change flexeril to 10 TID scheduled; Decrease oxycodone to Oxycodone to 5 mg po q am, noon and 10 mg po q HS and 5 mg po q 6 hrs PRN , and discontinue hydrochlorothiazide 2/2 low bp's  7/3:  Decrease oxy to 2.5 TID and 10 HS and 2.5 q 6 prn, start vistaril and ca/mag supplement for muscle spasm.   7/9: Ortho apt - new brace  7/9: CXR  + infiltrates, started on levaquin 750 q day x 7, DC'd Norvasc due to low bp's    F/u today showing sig increase in geeta lower ext edema, new redness on R lower leg. Muscle spasms and pain stable.     Darryl is discharging today despite medical complexity and need for higher level of care due to co-pay.  He will need close f/u with PCP and f/u with Pulmonology as previously ordered.     Closed fracture of right tibia and fibula with routine healing, subsequent encounter and Muscle spasm  Today muscle spasms are stable - we are stopping vistaril at SNF discharge - but cont PTA KEAGAN and balcolfen with added flexeril, and oxy.   F/u with PCP and ortho    New redness on R lower leg due to Edema   Will give lasix 40 x 1 now then restart hydrochlorothiazide but at 50 mg vs PTA 25 mg. Only trace edema last week - sig increase.   PCP to f/u in 1 week.   -does not appear cellulitis and already on levaquin given lungs.       Pneumonia of left lower lobe due to infectious organism   and Hypoxia  needingO2 - 84% RA and lower unsure if this is only due to pnuemonia - it has been since SNF admission and suspect there is baseline disease that needs to be assessed by PCP/PUl - f/u with pul/PCP   [place] O2 and IS use.     Essential hypertension   stable off norvasc - but I am restarting hydrochlorothiazide and at 100% dose increase given edema.     Decubitus ulcer of coccyx, unstageable (H)  Improving per wound care nurse   She educated family on position changes and cont use of Triad    Skin ulcer  of right calf, limited to breakdown of skin (H)  See pic above - healing    Thyroid mass  F/u with PCP and ultrasound.     Past Medical History: reviewed in [facility] everywhere chart.     Discharge Medications:  Current Outpatient Medications   Medication Sig Dispense Refill     Calcium-Magnesium-Zinc 333-133-5 MG TABS per tablet Take 1 tablet by mouth At Bedtime 30 tablet 0     cyclobenzaprine (FLEXERIL) 10 MG tablet Take 1 tablet (10 mg) by mouth 3 times daily 90 tablet 0     hydrochlorothiazide (HYDRODIURIL) 50 MG tablet Take 1 tablet (50 mg) by mouth daily       oxyCODONE (ROXICODONE) 5 MG tablet 1/2 TABLET (2.5MG) BY MOUTH THREE TIMES DAILY;2 TABLETS (10MG) BY MOUTH AT BEDTIME;1/2 TABLET (2.5MG) BY MOUTH EVERY 6 HOURS AS NEEDED DX PAIN - decrease amount as able 60 tablet 0     polyethylene glycol (MIRALAX) 17 GM/SCOOP powder Take 17 g (1 capful) by mouth daily 1 Bottle 0     senna (SENOKOT) 8.6 MG tablet Take 3 tablets by mouth 2 times daily And 1 tab po BID PRN 90 tablet 1     acetaminophen (TYLENOL) 500 MG tablet Take 1,000 mg by mouth 4 times daily       aspirin 81 MG EC tablet Take 81 mg by mouth daily       clotrimazole (LOTRIMIN) 1 % external cream Apply topically 2 times daily as needed       Cyanocobalamin (VITAMIN B 12 PO) Take 1 capsule by mouth daily       gabapentin (NEURONTIN) 300 MG capsule Take 300 mg by mouth 3 times daily Administer Between- 7:00-10:00, 11:00-14:00, 19:00-23:00       levofloxacin (LEVAQUIN) 750 MG tablet Take 750 mg by mouth daily       multivitamin w/minerals (THERA-VIT-M) tablet Take 1 tablet by mouth daily       venlafaxine (EFFEXOR-XR) 150 MG 24 hr capsule Take 150 mg by mouth daily (with dinner)       Medication Changes/Rationale:     See above in HPI    Controlled medications sent with patient:   Medication oxycodone  electronically prescribed to [facility] in [facility] pharmacy     ROS:   4 point ROS including Respiratory, CV, GI and , other than that noted in the HPI,   is negative    Physical Exam:   Vitals: /69   Pulse 83   Temp 97.9  F (36.6  C)   Resp 16   Wt 121.6 kg (268 lb)   SpO2 92%   BMI 35.36 kg/m    BMI= Body mass index is 35.36 kg/m .  GENERAL APPEARANCE:  Alert, in no distress  RESP:  respiratory effort and palpation of chest normal, auscultation of lungs diminished and geeta lower fine crackles , no respiratory distress  CV:  Palpation and auscultation of heart done , rate and rhythm reg, no murmur, new +2 geeta lower ext peripheral edema  ABDOMEN:  normal bowel sounds, soft, nontender, no hepatosplenomegaly or other masses  M/S:   Gait and station NWB - WC dep, Digits and nails intact  SKIN:  Inspection and Palpation of skin and subcutaneous tissue Coccyx ucler not viewed by me - R leg new redness- marked - less than yesterday - likely due to edema - see pic above.   NEURO: 2-12 in normal limits and at patient's baseline  PSYCH:  insight and judgement, memory intact , affect and mood Pleasant        SNF labs: no labs done at SNF except CXR    DISCHARGE PLAN:    Follow up labs: recommend PCP to f/u BMP given restarting HCTZ    Medical Follow Up:      Follow up with primary care provider in 1 weeks  Follow up with specialist Pulmonary     MTM referral needed and placed by this provider: No    Discharge Services: none per pt's request    Discharge Instructions Verbalized to Patient at Discharge:     Monitor redness on R lower leg - if worsen call PCP    Monitor edema in geeta lower ext - if worsen - call pcp    F/u with Pul    F/u with U/S on thyroid    TOTAL DISCHARGE TIME:   Greater than 30 minutes  Electronically signed by:  FLORIN Musa CNP       Face to Face and Medical Necessity Statement for DME Provider visit    Demographic Information on Madhav Kirk:  Gender: male  : 1955  [address]  [place] [id number] [phone] (home) [phone] (work)    Medical Record: [id number]  Social Security Number:  [id number]  Primary Care Provider: Nikolai Good  Insurance: Payor: HUMANA / Plan: HUMANA MEDICARE ADVANTAGE / Product Type: Medicare /     HPI:   Madhav Kirk is a 64 year old  (1955), who is being seen today for a face to face provider visit at [facility]; medical necessity statement for DME included. This patient requires the following:  DME Ordered and Medical Necessity Statement   Oxygen: 2 L/min via NC continuous ; Clinical Documentation: (Obtain documented RA sat with in 2 days of discharge in acute setting or within 30 days of provider visit):  Method 1: Room air at rest: Qualifing sat level is < 88% or below on room air at rest on 7/13 date      Pt needing above DME with expected length of need of 6 months     due to medical necessity associated with following diagnosis:    Pneumonia of left lower lobe due to infectious organism  Hypoxia of unknown reason.      Orders:  1. Facility staff/TC to contact DME company to get their order form for provider to fill out    ELECTRONICALLY SIGNED BY BRANDO CERTIFIED PROVIDER:  FLORIN Musa CNP   NPI: [id number]   [facility]  [address] [place] [address], [address]  [place] [address]                        Sincerely,        FLORIN Musa CNP

## 2025-08-26 ENCOUNTER — HOSPITAL ENCOUNTER (INPATIENT)
Facility: CLINIC | Age: 70
End: 2025-08-26
Attending: FAMILY MEDICINE | Admitting: INTERNAL MEDICINE
Payer: COMMERCIAL

## 2025-08-26 ENCOUNTER — APPOINTMENT (OUTPATIENT)
Dept: CT IMAGING | Facility: CLINIC | Age: 70
End: 2025-08-26
Attending: FAMILY MEDICINE
Payer: COMMERCIAL

## 2025-08-26 PROBLEM — N10 ACUTE PYELONEPHRITIS: Status: ACTIVE | Noted: 2025-08-26

## 2025-08-26 PROCEDURE — 70450 CT HEAD/BRAIN W/O DYE: CPT

## 2025-08-26 PROCEDURE — 74177 CT ABD & PELVIS W/CONTRAST: CPT

## 2025-08-26 ASSESSMENT — ACTIVITIES OF DAILY LIVING (ADL)
WEAR_GLASSES_OR_BLIND: YES
COMMUNICATION: DIFFICULTY UNDERSTANDING
WALKING_OR_CLIMBING_STAIRS: OTHER (SEE COMMENTS)
HEARING_DIFFICULTY_OR_DEAF: NO
ADLS_ACUITY_SCORE: 41
ADLS_ACUITY_SCORE: 41
DOING_ERRANDS_INDEPENDENTLY_DIFFICULTY: YES
CHANGE_IN_FUNCTIONAL_STATUS_SINCE_ONSET_OF_CURRENT_ILLNESS/INJURY: NO
DRESSING/BATHING_MANAGEMENT: ASSIST OF 1-2
ADLS_ACUITY_SCORE: 41
ADLS_ACUITY_SCORE: 57
TOILETING: 2-->COMPLETELY DEPENDENT (NOT DEVELOPMENTALLY APPROPRIATE)
VISION_MANAGEMENT: READERS
EQUIPMENT_CURRENTLY_USED_AT_HOME: LIFT DEVICE
FALL_HISTORY_WITHIN_LAST_SIX_MONTHS: NO
TOILETING: 2-->COMPLETELY DEPENDENT
TOILETING_ISSUES: YES
ADLS_ACUITY_SCORE: 41
ADLS_ACUITY_SCORE: 57
EATING/SWALLOWING: EATING
DRESSING/BATHING_DIFFICULTY: YES
ADLS_ACUITY_SCORE: 41
ADLS_ACUITY_SCORE: 41
WALKING_OR_CLIMBING_STAIRS_DIFFICULTY: YES
COMMUNICATION_MANAGEMENT: POST STROKE
ADLS_ACUITY_SCORE: 41
DIFFICULTY_EATING/SWALLOWING: YES
ADLS_ACUITY_SCORE: 41
DIFFICULTY_COMMUNICATING: YES
ADLS_ACUITY_SCORE: 41
ADLS_ACUITY_SCORE: 57
ADLS_ACUITY_SCORE: 42
ADLS_ACUITY_SCORE: 41
ADLS_ACUITY_SCORE: 41
CONCENTRATING,_REMEMBERING_OR_MAKING_DECISIONS_DIFFICULTY: YES
TOILETING_ASSISTANCE: TOILETING DIFFICULTY, ASSISTANCE 1 PERSON

## 2025-08-26 ASSESSMENT — COLUMBIA-SUICIDE SEVERITY RATING SCALE - C-SSRS: IS THE PATIENT NOT ABLE TO COMPLETE C-SSRS: UNABLE TO VERBALIZE

## 2025-08-27 ENCOUNTER — APPOINTMENT (OUTPATIENT)
Dept: PHYSICAL THERAPY | Facility: CLINIC | Age: 70
End: 2025-08-27
Payer: COMMERCIAL

## 2025-08-27 ENCOUNTER — APPOINTMENT (OUTPATIENT)
Dept: OCCUPATIONAL THERAPY | Facility: CLINIC | Age: 70
End: 2025-08-27
Payer: COMMERCIAL

## 2025-08-27 ASSESSMENT — ACTIVITIES OF DAILY LIVING (ADL)
ADLS_ACUITY_SCORE: 60
ADLS_ACUITY_SCORE: 57
ADLS_ACUITY_SCORE: 57
ADLS_ACUITY_SCORE: 58
ADLS_ACUITY_SCORE: 57
ADLS_ACUITY_SCORE: 58
ADLS_ACUITY_SCORE: 58
ADLS_ACUITY_SCORE: 68
DEPENDENT_IADLS:: CLEANING;COOKING;LAUNDRY;SHOPPING;MEAL PREPARATION;MEDICATION MANAGEMENT;MONEY MANAGEMENT;TRANSPORTATION
ADLS_ACUITY_SCORE: 58
ADLS_ACUITY_SCORE: 57
ADLS_ACUITY_SCORE: 57
ADLS_ACUITY_SCORE: 60
ADLS_ACUITY_SCORE: 57
ADLS_ACUITY_SCORE: 60
ADLS_ACUITY_SCORE: 57
ADLS_ACUITY_SCORE: 58
ADLS_ACUITY_SCORE: 58
ADLS_ACUITY_SCORE: 57
ADLS_ACUITY_SCORE: 58
ADLS_ACUITY_SCORE: 68

## 2025-08-28 ASSESSMENT — ACTIVITIES OF DAILY LIVING (ADL)
ADLS_ACUITY_SCORE: 68
ADLS_ACUITY_SCORE: 66
ADLS_ACUITY_SCORE: 68
ADLS_ACUITY_SCORE: 66

## 2025-08-29 ASSESSMENT — ACTIVITIES OF DAILY LIVING (ADL)
ADLS_ACUITY_SCORE: 64
ADLS_ACUITY_SCORE: 64
ADLS_ACUITY_SCORE: 68
ADLS_ACUITY_SCORE: 68
ADLS_ACUITY_SCORE: 64
ADLS_ACUITY_SCORE: 68
ADLS_ACUITY_SCORE: 64
ADLS_ACUITY_SCORE: 64
ADLS_ACUITY_SCORE: 68
ADLS_ACUITY_SCORE: 64
ADLS_ACUITY_SCORE: 64
ADLS_ACUITY_SCORE: 68
ADLS_ACUITY_SCORE: 64
ADLS_ACUITY_SCORE: 64
ADLS_ACUITY_SCORE: 68
ADLS_ACUITY_SCORE: 68
ADLS_ACUITY_SCORE: 64
ADLS_ACUITY_SCORE: 68
ADLS_ACUITY_SCORE: 68
ADLS_ACUITY_SCORE: 64
ADLS_ACUITY_SCORE: 68

## 2025-08-30 ASSESSMENT — ACTIVITIES OF DAILY LIVING (ADL)
ADLS_ACUITY_SCORE: 66
ADLS_ACUITY_SCORE: 64
ADLS_ACUITY_SCORE: 66
ADLS_ACUITY_SCORE: 64
ADLS_ACUITY_SCORE: 66
ADLS_ACUITY_SCORE: 66
ADLS_ACUITY_SCORE: 64
ADLS_ACUITY_SCORE: 64
ADLS_ACUITY_SCORE: 66